# Patient Record
Sex: FEMALE | Race: WHITE | NOT HISPANIC OR LATINO | Employment: STUDENT | URBAN - METROPOLITAN AREA
[De-identification: names, ages, dates, MRNs, and addresses within clinical notes are randomized per-mention and may not be internally consistent; named-entity substitution may affect disease eponyms.]

---
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
## 2020-11-05 ENCOUNTER — VIRTUAL VISIT (OUTPATIENT)
Dept: FAMILY MEDICINE | Facility: OTHER | Age: 20
End: 2020-11-05
Payer: COMMERCIAL

## 2020-11-05 PROCEDURE — 99421 OL DIG E/M SVC 5-10 MIN: CPT | Performed by: PHYSICIAN ASSISTANT

## 2020-11-05 NOTE — PROGRESS NOTES
"Date: 2020 11:34:59  Clinician: Romeo Tsang  Clinician NPI: [id number]  Patient: Nedra Colón  Patient : 2000  Patient Address: [address] [place] [address]  Patient Phone: [phone]  Visit Protocol: URI  Patient Summary:  Nedra is a 20 year old ( : 2000 ) female who initiated a [facility] Visit for COVID-19 (Coronavirus) evaluation and screening. When asked the question \"Please sign me up to receive news, health information and promotions from [facility].\", Nedra responded \"No\".    Nedra states her symptoms started 1-2 days ago.   Her symptoms consist of myalgia, malaise, a sore throat, a headache, enlarged lymph nodes, a cough, and nausea.   Symptom details     Cough: Nedra coughs a few times an hour and her cough is not more bothersome at night. Phlegm comes into her throat when she coughs. She believes her cough is caused by post-nasal drip. The color of the phlegm is clear.     Sore throat: Nedra reports having mild throat pain (1-3 on a 10 point pain scale), does not have exudate on her tonsils, and can swallow liquids. The lymph nodes in her neck are enlarged. A rash has not appeared on the skin since the sore throat started.     Headache: She states the headache is mild (1-3 on a 10 point pain scale).      Nedra denies having vomiting, rhinitis, facial pain or pressure, chills, teeth pain, ageusia, diarrhea, ear pain, wheezing, fever, nasal congestion, and anosmia. She also denies taking antibiotic medication in the past month and having recent facial or sinus surgery in the past 60 days. She is not experiencing dyspnea.   Precipitating events  Within the past week, Nedra has not been exposed to someone with strep throat. She has not recently been exposed to someone with influenza. Nedra has not been in close contact with any high risk individuals.   Pertinent COVID-19 (Coronavirus) information  Nedra does not work or volunteer as healthcare worker or a first " responder. In the past 14 days, Nedra has not worked or volunteered at a healthcare facility or group living setting.   In the past 14 days, she also has not lived in a congregate living setting.   Nedra has not had a close contact with a laboratory-confirmed COVID-19 patient within 14 days of symptom onset.    Since December 2019, Nedra has not been tested for COVID-19 and has not had upper respiratory infection or influenza-like illness.   Pertinent medical history  Nedra does not get yeast infections when she takes antibiotics.   Nedra does not need a return to work/school note.   Weight: 110 lbs   Nedra does not smoke or use smokeless tobacco.   She denies pregnancy and denies breastfeeding. She is currently menstruating.   Weight: 110 lbs    MEDICATIONS: No current medications, ALLERGIES: NKDA  Clinician Response:  Dear Nedra,   Your symptoms show that you may have coronavirus (COVID-19). This illness can cause fever, cough and trouble breathing. Many people get a mild case and get better on their own. Some people can get very sick.  What should I do?  We would like to test you for this virus.   1. Please call [phone] to schedule your visit. Explain that you were referred by [facility] to have a COVID-19 test. Be ready to share your [facility] visit ID number.  * If you need to schedule in [facility] please call [phone] or for [place] employees please call [phone].  * If you need to schedule in the [place] area please call [phone]. [place] employees call [phone].  The following will serve as your written order for this COVID Test, ordered by me, for the indication of suspected COVID [Z20.828]: The test will be ordered in [employer], our electronic health record, after you are scheduled. It will show as ordered and authorized by Gelacio Zheng MD.  Order: COVID-19 (Coronavirus) PCR for SYMPTOMATIC testing from [facility].   2. When it's time for your COVID test:  Stay at least 6 feet away from  "others. (If someone will drive you to your test, stay in the backseat, as far away from the  as you can.)   Cover your mouth and nose with a mask, tissue or washcloth.  Go straight to the testing site. Don't make any stops on the way there or back.      3.Starting now: Stay home and away from others (self-isolate) until:   You've had no fever---and no medicine that reduces fever---for one full day (24 hours). And...   Your other symptoms have gotten better. For example, your cough or breathing has improved. And...   At least 10 days have passed since your symptoms started.       During this time, don't leave the house except for testing or medical care.   Stay in your own room, even for meals. Use your own bathroom if you can.   Stay away from others in your home. No hugging, kissing or shaking hands. No visitors.  Don't go to work, school or anywhere else.    Clean \"high touch\" surfaces often (doorknobs, counters, handles, etc.). Use a household cleaning spray or wipes. You'll find a full list of  on the EPA website: www.epa.gov/pesticide-registration/list-n-disinfectants-use-against-sars-cov-2.   Cover your mouth and nose with a mask, tissue or washcloth to avoid spreading germs.  Wash your hands and face often. Use soap and water.  Caregivers in these groups are at risk for severe illness due to COVID-19:  o People 65 years and older  o People who live in a nursing home or long-term care facility  o People with chronic disease (lung, heart, cancer, diabetes, kidney, liver, immunologic)  o People who have a weakened immune system, including those who:   Are in cancer treatment  Take medicine that weakens the immune system, such as corticosteroids  Had a bone marrow or organ transplant  Have an immune deficiency  Have poorly controlled HIV or AIDS  Are obese (body mass index of 40 or higher)  Smoke regularly   o Caregivers should wear gloves while washing dishes, handling laundry and cleaning bedrooms " and bathrooms.  o Use caution when washing and drying laundry: Don't shake dirty laundry, and use the warmest water setting that you can.  o For more tips, go to www.cdc.gov/coronavirus/2019-ncov/downloads/10Things.pdf.       How can I take care of myself?   Get lots of rest. Drink extra fluids (unless a doctor has told you not to).   Take Tylenol (acetaminophen) for fever or pain. If you have liver or kidney problems, ask your family doctor if it's okay to take Tylenol.   Adults can take either:    650 mg (two 325 mg pills) every 4 to 6 hours, or...   1,000 mg (two 500 mg pills) every 8 hours as needed.    Note: Don't take more than 3,000 mg in one day. Acetaminophen is found in many medicines (both prescribed and over-the-counter medicines). Read all labels to be sure you don't take too much.   For children, check the Tylenol bottle for the right dose. The dose is based on the child's age or weight.    If you have other health problems (like cancer, heart failure, an organ transplant or severe kidney disease): Call your specialty clinic if you don't feel better in the next 2 days.       Know when to call 911. Emergency warning signs include:    Trouble breathing or shortness of breath Pain or pressure in the chest that doesn't go away Feeling confused like you haven't felt before, or not being able to wake up Bluish-colored lips or face.  Where can I get more information?   [facility] -- About COVID-19: [email or web address]   CDC -- What to Do If You're Sick: www.cdc.gov/coronavirus/2019-ncov/about/steps-when-sick.html   CDC -- Ending Home Isolation: www.cdc.gov/coronavirus/2019-ncov/hcp/disposition-in-home-patients.html   CDC -- Caring for Someone: www.cdc.gov/coronavirus/2019-ncov/if-you-are-sick/care-for-someone.html   [facility] -- Interim Guidance for Hospital Discharge to Home: www.health.[facility].[place]./diseases/coronavirus/hcp/hospdischarge.pdf   [facility] clinical trials (COVID-19  research studies): clinicalaffairs.[facility]-clinical-trials    Below are the COVID-19 hotlines at the [place] Department of Health ([facility]). Interpreters are available.    For health questions: Call 756-617-0952 or 1-694.548.7501 (7 a.m. to 7 p.m.) For questions about schools and childcare: Call 888-428-1377 or 1-390.117.7961 (7 a.m. to 7 p.m.)    Diagnosis: Contact with and (suspected) exposure to other viral communicable diseases  Diagnosis ICD: Z20.828

## 2021-03-11 ENCOUNTER — TRANSFERRED RECORDS (OUTPATIENT)
Dept: HEALTH INFORMATION MANAGEMENT | Facility: CLINIC | Age: 21
End: 2021-03-11

## 2021-07-05 ENCOUNTER — PRE VISIT (OUTPATIENT)
Dept: OTOLARYNGOLOGY | Facility: CLINIC | Age: 21
End: 2021-07-05

## 2021-07-05 ENCOUNTER — VIRTUAL VISIT (OUTPATIENT)
Dept: OTOLARYNGOLOGY | Facility: CLINIC | Age: 21
End: 2021-07-05
Payer: COMMERCIAL

## 2021-07-05 DIAGNOSIS — J38.3 VOCAL CORD DYSFUNCTION: Primary | ICD-10-CM

## 2021-07-05 DIAGNOSIS — R06.09 DYSPNEA ON EXERTION: ICD-10-CM

## 2021-07-05 PROCEDURE — 92507 TX SP LANG VOICE COMM INDIV: CPT | Mod: GN | Performed by: SPEECH-LANGUAGE PATHOLOGIST

## 2021-07-05 PROCEDURE — 92524 BEHAVRAL QUALIT ANALYS VOICE: CPT | Mod: GN | Performed by: SPEECH-LANGUAGE PATHOLOGIST

## 2021-07-05 PROCEDURE — 99207 PR NO CHARGE LOS: CPT | Performed by: SPEECH-LANGUAGE PATHOLOGIST

## 2021-07-05 NOTE — LETTER
7/5/2021       RE: Nedra Colón  [address] [facility] [address]     Dear Colleague,    Thank you for referring your patient, Nedra Colón, to the [facility] [place] at [facility]. Please see a copy of my visit note below.    Nedra Colón is a 20 year old female who is being evaluated via a billable video visit.      Nedra has been notified and verbally consented to the following:     This video visit will be conducted between you and your provider.    Patient has opted to conduct today's video visit vs an in-person appointment.     Video visits are billed at different rates depending on your insurance coverage. Please reach out to your insurance provider with any questions.     If during the course of the call the provider feels the appointment is not appropriate, you will not be charged for this service.  Provider has received verbal consent for billable virtual visit from the patient? Yes  Will anyone else be joining your video visit? No    Call initiated at: 1300   Type of Visit Platform Used: [employer]  Location of provider: [facility]  Location of patient: [facility]  Song Marroquin Jr., M.D., F.A.C.S.  Michelle Posey M.D., M.P.H.  Shira Thakkar M.D.  Pallavi Nielsen, Ph.D., CCC-SLP  Melvin An, Ph.D., CCC-SLP  Daxa Fitzpatrick M.M. (voice), M.A., CCC-SLP  Coleman Rinaldi M.M. (voice), M.A., CCC-SLP  ANA MARÍA Henderson (voice), M.S., CCC-SLP    [facility]  BREATHING DISORDER EVALUATION AND TREATMENT REPORT    Patient: Nedra Colón  Date of Service: 7/5/2021    HISTORY OF PRESENT ILLNESS  Nedra Colón was seen for evaluation and treatment for Vocal Cord Dysfunction (VCD), also known as Paradoxical Vocal Fold Motion (PVFM), today.  She was referred for this visit by Dr. Nye.  Please refer to Dr. Nye's dictation elsewhere in this chart for a more complete history of her  "disorder.     Nedra is a 20 year old who reports she did synchronized swimming in high school and initially went in to be seen for difficulty breathing her [employer] year of high school. She was initially told it was likely mild exercise-induced asthma. Recently, she had to be seen for clearance to take a scuba-diving class, and her pulmonologist suspected that her EIA is more likely VCD. She still has difficulty with inhalation whenever she does exercise or her allergies are triggered around pet dander, or in cold air with activity like shoveling snow. 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     The following is a brief synopsis of her reported symptom history:    Initial onset: 6 years ago (in mario alberto year of high school)     Description: \"After a routine or during Mello or Just Dance, I have coughing fits when I try to breathe. \"    difficulty with inhalation    central chest tightness/pressure    inspiratory stridor    expiratory wheeze    increased coughing or throat clearing    increased phlegm    Episodes occur dependent on strenuous exercise- typically 2-3x/week    Symptoms are Stable since onset    Onset is typically within 3-4 minutes of triggers.    Episodes resolve within 2-3 minutes.    Strategies that reduce symptoms: placing arms over her head to open chest, slow, deep breaths, stopping and resting.     Patient reports inhalers were slightly helpful.     Activities/triggers include:  o physical activity (running, stairs, swimming, dancing and hiking)  o voice use (laughing)  o temperatures (cold air)  o post-nasal drainage    Additional symptoms include:  o frequent sighing or yawning    Ongoing impact on ADLs includes not being able to keep up with friends during activities.     Reflux: Yes, she wakes up during the night and feels reflux in her throat after eating particular foods.    Mucus/PND: Yes, more than the average person. " "    Pertinent Medical Hx:     Unremarkable, healthy    Treatment hx of symptoms has included:   o PCP workup included diagnosis of exercise-induced asthma  o Pulmonary workup is negative for asthma  - Diagnostic exams have included PFT, spirometry  - Treatment attempts have included inhalers, with limited results.    Current medications which can affect laryngeal system: none    PATIENT REPORTED MEASURES  Dyspnea Index Questionnaire:  Dyspnea Index 7/5/2021   1. I have trouble getting air in. 2   2. I feel tightness in my throat when I am having arturo breathing problem. 2   3. It takes more effort to breathe than it used to. 2   4. Change in weather affect my breathing problem. 0   5. My breathing gets worse with stress. 0   6. I make sound/noise breathing in 1   7. I have to strain to breathe. 1   8. My shortness of breath gets worse with exercise or physical activity 4   9. My breathing problem makes me feel stressed. 0   10. My breathing problem casuses me to restrict my personal and social life. 3   Dyspnea Index Total Score 15       Self-Ratings as discussed with patient:  Dysponia SLP Goals 7/5/2021   How severe is your cough /throat clearing, if 0 is no cough at all and 10 is the worst cough? 6   How would you rate your breathing, if 0 is the worst and 10 is the best? 5   How much does your breathing problem bother you?         Somewhat       PERCEPTUAL BREATHING EVALUATION (CPT 84365)  At rest: appears within normal limits; quiet, no apparent distress or effort  When asked to take a volitional \"deep\" breath: excessive thoracic muscle use pattern, clavicular elevation during inspiration and increased upper thoracic muscle recruitment  When asked to pant: excessive thoracic muscle use pattern, clavicular elevation during inspiration and shoulder and neck involvement  During exertion doing jumping jackRadha graysa demonstrated:    elevated and tense shoulders    clavicular elevation for inspiration    within 3 " "minutes    THERAPEUTIC ACTIVITIES  During this process, Nedra learned:    Techniques for oral configurations to use during inspiration, to provide better abduction and arrest inhalatory stridor; these included: inhaling through rounded lips or through a straw; inhaling through the nose with closed lips; inhaling with tongue tip lightly touching the roof of her mouth, and inhaling with her tongue slightly curled down the center to create a pathway for airflow    Techniques for oral configurations to use during exhalation, to provide increased intra-oral and supraglottic air pressure to decrease vocal fold adduction; these included: a pulsed and prolonged \"Shhhh\" on exhalation, and a sustained pursed lip exhalation with slight ballooning of cheeks    To use abdominal relaxation and contraction of the external intercostals during inhalation, to allow for maximum diaphragmatic descent; I instructed her to place her hands on her lower ribcage to provide manual feedback for correct inhalation technique; she found this to be very helpful    During these probes, Nedra reported:     She found the tongue tuck and taco tongue with pulsed \"shh\" exhalation to be most facilitative to improve air flow    She felt her neck was less tight than it is otherwise and her recovery from exercise was faster than normal    Education and Counseling:    I provided education regarding laryngeal anatomy and physiology, including explanation of irritable larynx syndrome and the self-perpetuating cycle of laryngeal irritation and resulting symptoms.     I provided an explanation of the therapy plan, as it specifically related to her individual symptoms, and therapeutic rationale and instruction of a practice regimen.     She found this information helpful and states she is eager to attend therapy and apply techniques.     IMPRESSIONS AND PLAN  Based on today's evaluation and treatment, it would seem likely that Evelyns dyspnea on exertion has " been due to both poor laryngeal mechanics (Vocal Cord Dysfunction, J38.3) and non-optimal respiratory mechanics (Dyspnea on Exertion, R06.02).  With training of techniques for laryngeal and respiratory mechanics, she was able to exert herself with greatly reduced symptoms.  She will continue practicing these techniques at home, with reinforcement and instruction of additional strategies to be provided at future sessions. Nedra is in agreement with this plan.     FREQUENCY/DURATION: Two monthly one-hour return video visit sessions    Goals:  Patient goal:    To understand the problem and fix it as much as possible     Short-term goal(s): Within the first 4 sessions, Nedra will:  -- utilize vocal hygiene strategies in order to minimize laryngeal irritation and facilitate improved therapeutic outcomes with 90% accy.  -- demonstrate silent inhalation and abdominal breathing pattern in order to optimize breathing mechanics with 90% accy and min cues.  -- demonstrate relaxed throat breathing and laryngeal release in order to reduce upper airway constriction with 90% accy and min cues.    Long-term goal(s): In 3 months, Nedra will:  -- report a week of typical activities, in which shortness of breath does not exceed a level  of 2 out of 10, 90% of the time.    This treatment plan was developed with the patient who agreed with the recommendations.    PRIMARY ICD-10 code:  Vocal Cord Dysfunction (VCD)/Paradoxical Vocal Fold Motion (PVFM) (J38.3)  SECONDARY ICD-10 code: Dyspnea On Exertion (R06.09)    TOTAL SERVICE TIME: 65 minutes  EVALUATION OF VOICE AND RESONANCE (41579)  TREATMENT (52756)  NO CHARGE FACILITY FEE (84232)    Susie Henderson (voice) MPeñaS., CCC-SLP  Speech-Language Pathologist  [facility]  [phone]  [email or web address]  Pronouns: she/her/hers      *this report was created in part through the use of computerized dictation software, and though reviewed following completion, some  typographic errors may persist.  If there is confusion regarding any of this notes contents, please contact me for clarification        Again, thank you for allowing me to participate in the care of your patient.      Sincerely,    Lorenza Finley SLP

## 2021-07-05 NOTE — PATIENT INSTRUCTIONS
Hello!    It was a pleasure to see you today. Please complete the exercises below and remember, a few minutes of practice many times throughout the day is more important than one large practice session. If you have any questions about your strategies, feel free to contact me at [email or web address] or via [employer]. Please call [phone] for scheduling alterations.     Please know that this email will be automatically deleted from the  after 30 days, so please COPY AND SAVE your exercises and handouts to your own computer.     Home Exercise Program:  BELLY BREATHING (3 breaths per hour, until habit)  If you need to initially, stretch your arms up and lean toward each side, feeling your ribcage lift and expand. Then lean forward and allow your back and neck to stretch and relax for 30 seconds.   1. Sit hinged forward with your elbows on your knees. OR Sit normally and place your hands on either side of your belly or low ribs  2. Slowly breathe in and feel your belly and back expand, like filling a balloon, pushing out against your waistband  3. Slowly breathe out and feel your belly and back crunch inward, like zipping tight pants  4. Repeat slowly and take breaks if you get dizzy  HELPFUL HINTS:  -- Tie a string or piece of elastic around your low ribs when you get dressed. You'll be able to feel yourself expand against this as you breathe all day.   -- Some people find it easiest to practice while laying on their back or in a recliner, hands on their belly.    RESCUE BREATHS (4-5 breaths, 2-3 sets/day and IF SHORT OF BREATH)  -- Use a mirror if it helps to see your mouth and lips  -- Remember to focus on SILENT inhalation every time  -- Practice these daily when you're relaxed and focus on correct technique. The more you practice, the easier these become when you are not relaxed.    TONGUE TUCK: Place the tip of your tongue against the roof of your mouth, creating a column in the center. Breathe in  "and feel cool & silent air sweep in along the sides of your tongue and down the back of your throat. Now keep your tongue tip in place and simply exhale.     This strategy will make your mouth dry, so stop and move some saliva around with your tongue whenever needed, then return to your strategy.     The tongue tuck is easiest to learn and start applying to movement, but doesn't open the vocal folds as forcefully as some other strategies.     STRAW LIPS & Shhh: Round your lips like a milkshake straw and inhale, feeling cool & silent air sweep through the center of your mouth. Exhale on a \"shhhh\" or \"sh sh sh sh sh\", whichever feels better.     SNIFF & Shhh: Decide whether you prefer one long, gentle sniff in through your nose or 2-3 short, quick sniffs. Next, gently blow out through tight whistle lips with your cheeks ballooned. Blow more gently and slower if you feel dizzy. Exhale on a \"shhhh\" or \"sh sh sh sh sh\", whichever feels better.     PUPPY YAWN & BLOW: Let your tongue relax flat against your bottom lip like a puppy, keeping your lips and jaw open. Silently inhale with a cool, yawny sensation. Next, gently blow out through tight whistle lip with your cheeks ballooned. Blow more gently and slower if you feel dizzy.    TACO TONGUE & BLOW: If you can, curl your tongue slightly like a hard taco shell, keeping your lips and jaw open. Silently inhale with a cool, yawny sensation. Next, gently blow out through tight whistle lip with your cheeks ballooned. Blow more gently and slower if you feel dizzy.    Thank you and have a great day!  Lorenza"

## 2021-07-05 NOTE — PROGRESS NOTES
Nedra Colón is a 20 year old female who is being evaluated via a billable video visit.      Nedra has been notified and verbally consented to the following:     This video visit will be conducted between you and your provider.    Patient has opted to conduct today's video visit vs an in-person appointment.     Video visits are billed at different rates depending on your insurance coverage. Please reach out to your insurance provider with any questions.     If during the course of the call the provider feels the appointment is not appropriate, you will not be charged for this service.  Provider has received verbal consent for billable virtual visit from the patient? Yes  Will anyone else be joining your video visit? No    Call initiated at: 1300   Type of Visit Platform Used: [employer]  Location of provider: [facility]  Location of patient: [facility]  Song Marroquin Jr., M.D., F.A.C.S.  Michelle Posey M.D., M.P.H.  Shira Thakkar M.D.  Pallavi Nielsen, Ph.D., CCC-SLP  Melvin An, Ph.D., [facility]-SLP  Daxa Fitzpatrick M.M. (voice), M.A., CCC-SLP  Coleman Rinaldi M.M. (voice), M.A., CCC-SLP  ANA MARÍA Henderson (voice), M.S., [facility]  BREATHING DISORDER EVALUATION AND TREATMENT REPORT    Patient: Nedra Coóln  Date of Service: 7/5/2021    HISTORY OF PRESENT ILLNESS  Nedra oClón was seen for evaluation and treatment for Vocal Cord Dysfunction (VCD), also known as Paradoxical Vocal Fold Motion (PVFM), today.  She was referred for this visit by Dr. Nye.  Please refer to Dr. Nye's dictation elsewhere in this chart for a more complete history of her disorder.     Nedra is a 20 year old who reports she did synchronized swimming in high school and initially went in to be seen for difficulty breathing her mario alberto year of high school. She was initially told it was likely mild exercise-induced asthma. Recently, she had to be seen for clearance to take a  "scuba-diving class, and her pulmonologist suspected that her EIA is more likely VCD. She still has difficulty with inhalation whenever she does exercise or her allergies are triggered around pet dander, or in cold air with activity like shoveling snow. 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     The following is a brief synopsis of her reported symptom history:    Initial onset: 6 years ago (in mar[employer] alberto year of high school)     Description: \"After a routine or during Mello or Just Dance, I have coughing fits when I try to breathe. \"    difficulty with inhalation    central chest tightness/pressure    inspiratory stridor    expiratory wheeze    increased coughing or throat clearing    increased phlegm    Episodes occur dependent on strenuous exercise- typically 2-3x/week    Symptoms are Stable since onset    Onset is typically within 3-4 minutes of triggers.    Episodes resolve within 2-3 minutes.    Strategies that reduce symptoms: placing arms over her head to open chest, slow, deep breaths, stopping and resting.     Patient reports inhalers were slightly helpful.     Activities/triggers include:  o physical activity (running, stairs, swimming, dancing and hiking)  o voice use (laughing)  o temperatures (cold air)  o post-nasal drainage    Additional symptoms include:  o frequent sighing or yawning    Ongoing impact on ADLs includes not being able to keep up with friends during activities.     Reflux: Yes, she wakes up during the night and feels reflux in her throat after eating particular foods.    Mucus/PND: Yes, more than the average person.     Pertinent Medical Hx:     Unremarkable, healthy    Treatment hx of symptoms has included:   o PCP workup included diagnosis of exercise-induced asthma  o Pulmonary workup is negative for asthma  - Diagnostic exams have included PFT, spirometry  - Treatment attempts have included inhalers, with limited " "results.    Current medications which can affect laryngeal system: none    PATIENT REPORTED MEASURES  Dyspnea Index Questionnaire:  Dyspnea Index 7/5/2021   1. I have trouble getting air in. 2   2. I feel tightness in my throat when I am having arturo breathing problem. 2   3. It takes more effort to breathe than it used to. 2   4. Change in weather affect my breathing problem. 0   5. My breathing gets worse with stress. 0   6. I make sound/noise breathing in 1   7. I have to strain to breathe. 1   8. My shortness of breath gets worse with exercise or physical activity 4   9. My breathing problem makes me feel stressed. 0   10. My breathing problem casuses me to restrict my personal and social life. 3   Dyspnea Index Total Score 15       Self-Ratings as discussed with patient:  Dysponia SLP Goals 7/5/2021   How severe is your cough /throat clearing, if 0 is no cough at all and 10 is the worst cough? 6   How would you rate your breathing, if 0 is the worst and 10 is the best? 5   How much does your breathing problem bother you?         Somewhat       PERCEPTUAL BREATHING EVALUATION (CPT 98162)  At rest: appears within normal limits; quiet, no apparent distress or effort  When asked to take a volitional \"deep\" breath: excessive thoracic muscle use pattern, clavicular elevation during inspiration and increased upper thoracic muscle recruitment  When asked to pant: excessive thoracic muscle use pattern, clavicular elevation during inspiration and shoulder and neck involvement  During exertion doing jumping jacks, Nedra demonstrated:    elevated and tense shoulders    clavicular elevation for inspiration    within 3 minutes    THERAPEUTIC ACTIVITIES  During this process, Nedra learned:    Techniques for oral configurations to use during inspiration, to provide better abduction and arrest inhalatory stridor; these included: inhaling through rounded lips or through a straw; inhaling through the nose with closed lips; " "inhaling with tongue tip lightly touching the roof of her mouth, and inhaling with her tongue slightly curled down the center to create a pathway for airflow    Techniques for oral configurations to use during exhalation, to provide increased intra-oral and supraglottic air pressure to decrease vocal fold adduction; these included: a pulsed and prolonged \"Shhhh\" on exhalation, and a sustained pursed lip exhalation with slight ballooning of cheeks    To use abdominal relaxation and contraction of the external intercostals during inhalation, to allow for maximum diaphragmatic descent; I instructed her to place her hands on her lower ribcage to provide manual feedback for correct inhalation technique; she found this to be very helpful    During these probes, Nedra reported:     She found the tongue tuck and taco tongue with pulsed \"shh\" exhalation to be most facilitative to improve air flow    She felt her neck was less tight than it is otherwise and her recovery from exercise was faster than normal    Education and Counseling:    I provided education regarding laryngeal anatomy and physiology, including explanation of irritable larynx syndrome and the self-perpetuating cycle of laryngeal irritation and resulting symptoms.     I provided an explanation of the therapy plan, as it specifically related to her individual symptoms, and therapeutic rationale and instruction of a practice regimen.     She found this information helpful and states she is eager to attend therapy and apply techniques.     IMPRESSIONS AND PLAN  Based on today's evaluation and treatment, it would seem likely that Evelyns dyspnea on exertion has been due to both poor laryngeal mechanics (Vocal Cord Dysfunction, J38.3) and non-optimal respiratory mechanics (Dyspnea on Exertion, R06.02).  With training of techniques for laryngeal and respiratory mechanics, she was able to exert herself with greatly reduced symptoms.  She will continue practicing " these techniques at home, with reinforcement and instruction of additional strategies to be provided at future sessions. Nedra is in agreement with this plan.     FREQUENCY/DURATION: Two monthly one-hour return video visit sessions    Goals:  Patient goal:    To understand the problem and fix it as much as possible     Short-term goal(s): Within the first 4 sessions, Nedra will:  -- utilize vocal hygiene strategies in order to minimize laryngeal irritation and facilitate improved therapeutic outcomes with 90% accy.  -- demonstrate silent inhalation and abdominal breathing pattern in order to optimize breathing mechanics with 90% accy and min cues.  -- demonstrate relaxed throat breathing and laryngeal release in order to reduce upper airway constriction with 90% accy and min cues.    Long-term goal(s): In 3 months, Nedra will:  -- report a week of typical activities, in which shortness of breath does not exceed a level  of 2 out of 10, 90% of the time.    This treatment plan was developed with the patient who agreed with the recommendations.    PRIMARY ICD-10 code:  Vocal Cord Dysfunction (VCD)/Paradoxical Vocal Fold Motion (PVFM) (J38.3)  SECONDARY ICD-10 code: Dyspnea On Exertion (R06.09)    TOTAL SERVICE TIME: 65 minutes  EVALUATION OF VOICE AND RESONANCE (35393)  TREATMENT (25587)  NO CHARGE FACILITY FEE (32940)    Susie Henderson (voice), M.S., CCC-SLP  Speech-Language Pathologist  [facility]  [phone]  [email or web address]  Pronouns: she/her/hers      *this report was created in part through the use of computerized dictation software, and though reviewed following completion, some typographic errors may persist.  If there is confusion regarding any of this notes contents, please contact me for clarification

## 2021-07-29 ENCOUNTER — VIRTUAL VISIT (OUTPATIENT)
Dept: OTOLARYNGOLOGY | Facility: CLINIC | Age: 21
End: 2021-07-29
Payer: COMMERCIAL

## 2021-07-29 DIAGNOSIS — R06.09 DYSPNEA ON EXERTION: ICD-10-CM

## 2021-07-29 DIAGNOSIS — J38.3 VOCAL CORD DYSFUNCTION: Primary | ICD-10-CM

## 2021-07-29 PROCEDURE — 92507 TX SP LANG VOICE COMM INDIV: CPT | Mod: GN | Performed by: SPEECH-LANGUAGE PATHOLOGIST

## 2021-07-29 NOTE — LETTER
7/29/2021       RE: Nedra Colón  [address] [facility] [place] [address]     Dear Colleague,    Thank you for referring your patient, Nedra Colón, to the [facility]. Please see a copy of my visit note below.    Nedra Colón is a 21 year old female who is being evaluated via a billable video visit.      Nedra has been notified and verbally consented to the following:     This video visit will be conducted between you and your provider.    Patient has opted to conduct today's video visit vs an in-person appointment.     Video visits are billed at different rates depending on your insurance coverage. Please reach out to your insurance provider with any questions.     If during the course of the call the provider feels the appointment is not appropriate, you will not be charged for this service.  Provider has received verbal consent for billable virtual visit from the patient? Yes  Will anyone else be joining your video visit? No    Call initiated at: 0810   Type of Visit Platform Used: [employer]  Location of provider: Home  Location of patient: [facility] VOICE CLINIC  PROGRESS REPORT (CPT 25375)    Patient: Nedra Colón  Date of Service: 7/29/2021  Date of Last Service: 7/5/2021  Referring Physician: Dr. Nye  Impressions from evaluation on 7/5/2021 by Susie Henderson (voice) M.SPeña, CCC-SLP:  Based on today's evaluation and treatment, it would seem likely that Evelyns dyspnea on exertion has been due to both poor laryngeal mechanics (Vocal Cord Dysfunction, J38.3) and non-optimal respiratory mechanics (Dyspnea on Exertion, R06.02).  With training of techniques for laryngeal and respiratory mechanics, she was able to exert herself with greatly reduced symptoms.  She will continue practicing these techniques at home, with reinforcement and instruction of additional strategies to be provided at future  "sessions. Nedra is in agreement with this plan.    SUBJECTIVE:  Since Nedra's last session, she reports the following:   o Overall she has not had any change in her symptoms, which is expected, as she has only participated in an evaluation thus far and no therapeutic suggestions were made at the time.  o She feels like she still has difficulty doing the abdominal breathing pattern and notices her shoulders going up and down.   o She's been using the tongue tuck while climbing a big hill at camp and thinks it's a little helpful.     OBJECTIVE:  Patient Specific Goal Metrics:  Dysponia SLP Goals 7/5/2021   How severe is your cough /throat clearing, if 0 is no cough at all and 10 is the worst cough? 6   How would you rate your breathing, if 0 is the worst and 10 is the best? 5   How much does your breathing problem bother you?         Somewhat       THERAPEUTIC ACTIVITIES  Today Nedra participated in the following therapeutic activities:  Counseling and Education:    Asked questions about the nature of her symptoms, and I answered all of these thoroughly.    Concepts and techniques for using saline and plain-water gargling, nasal irrigation, gel saline spray, humidification, increased liquid intake, and steam to increase systemic and typical hydration and reduce post-nasal drainage and laryngeal irritation.    I provided Nedra with explanation and skilled instruction of:  Laryngeal release technique targeting reduced laryngeal elevation and constriction and improved vocal fold aBduction was instructed    Patient was instructed through negative practice of laryngeal elevation with high-frequency, closed vowel productions and low-frequency, open vowel productions in an effort to develop somato-sensory awareness of the laryngeal musculature    This was combined with aBductory breathing maneuvers    Patient was instructed to utilize a silent, yawny, \"uh\"-shaped inhalation with application to all other therapy " "tasks    She demonstrated excellent accuracy following moderate clinician support    Rescue breathing strategies using oral configurations to promote improved vocal fold abduction were instructed    Patient reported the tongue tuck and the \"taco tongue\" was most beneficial    Patient was able to demonstrate use of these techniques in combination with low respiratory engagement with good accuracy and moderate clinician support    A plan for when and how to implement these strategies was developed, and the patient was encouraged to practice the techniques independent of distress at least twice daily to habituate their use.   Review of abdominal breathing pattern with additional strategies was also provided, with improved accy using positional modifications and tactile feedback.     Instruction of Home Practice:    I instructed Nedra in the concepts of an optimal practice regimen, including use of an interval schedule with brief periods of practice frequently throughout each day, and concepts of volitional practice to facilitate motor learning.    I emphasized the therapeutic rational and provided an emailed After Visit Summary to reinforce today's therapeutic activities and facilitate home practice.    ASSESSMENT/PLAN  Progress toward long-term goals:  Minimal at this point, as this is first session, but good learning today    Impressions:  IMPRESSIONS: poor laryngeal mechanics (Vocal Cord Dysfunction, J38.3) and non-optimal respiratory mechanics (Dyspnea on Exertion, R06.02)    Nedra had a productive session of therapy today, working on laryngeal release, additional rescue breathing strategies, and clarification of abdominal breathing, plus PND management. She will continue to work on her exercises on a daily basis, and work on incorporating the techniques into her daily activities. Speech therapy continues to be medically necessary for Nedra to participate fully and engage in activities of daily living.     Plan: "   I will see Nedra in 6 weeks and will plan to add LPR precautions and combine breathing strategies with activity. Add session if cough and throat clearing is ongoing.   For practice goals, see AVS.     TOTAL SERVICE TIME: 60 minutes  TREATMENT (54791)  NO CHARGE FACILITY FEE    Susie Henderson (voice), M.S., CCC-SLP  Speech-Language Pathologist  [facility]  [phone]  [email or web address]  Pronouns: she/her/hers      *this report was created in part through the use of computerized dictation software, and though reviewed following completion, some typographic errors may persist.  If there is confusion regarding any of this notes contents, please contact me for clarification      Again, thank you for allowing me to participate in the care of your patient.      Sincerely,    Lorenza Finley, SLP

## 2021-07-29 NOTE — PROGRESS NOTES
Nedra Colón is a 21 year old female who is being evaluated via a billable video visit.      Nedra has been notified and verbally consented to the following:     This video visit will be conducted between you and your provider.    Patient has opted to conduct today's video visit vs an in-person appointment.     Video visits are billed at different rates depending on your insurance coverage. Please reach out to your insurance provider with any questions.     If during the course of the call the provider feels the appointment is not appropriate, you will not be charged for this service.  Provider has received verbal consent for billable virtual visit from the patient? Yes  Will anyone else be joining your video visit? No    Call initiated at: 0810   Type of Visit Platform Used: [employer] Video  Location of provider: Home  Location of patient: [facility] VOICE CLINIC  PROGRESS REPORT (CPT 01170)    Patient: Nedra Colón  Date of Service: 7/29/2021  Date of Last Service: 7/5/2021  Referring Physician: Dr. Nye  Impressions from evaluation on 7/5/2021 by Susie Henderson (voice), M.S., CCC-SLP:  Based on today's evaluation and treatment, it would seem likely that Evelyns dyspnea on exertion has been due to both poor laryngeal mechanics (Vocal Cord Dysfunction, J38.3) and non-optimal respiratory mechanics (Dyspnea on Exertion, R06.02).  With training of techniques for laryngeal and respiratory mechanics, she was able to exert herself with greatly reduced symptoms.  She will continue practicing these techniques at home, with reinforcement and instruction of additional strategies to be provided at future sessions. Nedra is in agreement with this plan.    SUBJECTIVE:  Since Nedra's last session, she reports the following:   o Overall she has not had any change in her symptoms, which is expected, as she has only participated in an evaluation thus far and no therapeutic suggestions were made at the time.  o She feels  "like she still has difficulty doing the abdominal breathing pattern and notices her shoulders going up and down.   o She's been using the tongue tuck while climbing a big hill at camp and thinks it's a little helpful.     OBJECTIVE:  Patient Specific Goal Metrics:  Dysponia SLP Goals 7/5/2021   How severe is your cough /throat clearing, if 0 is no cough at all and 10 is the worst cough? 6   How would you rate your breathing, if 0 is the worst and 10 is the best? 5   How much does your breathing problem bother you?         Somewhat       THERAPEUTIC ACTIVITIES  Today Nedra participated in the following therapeutic activities:  Counseling and Education:    Asked questions about the nature of her symptoms, and I answered all of these thoroughly.    Concepts and techniques for using saline and plain-water gargling, nasal irrigation, gel saline spray, humidification, increased liquid intake, and steam to increase systemic and typical hydration and reduce post-nasal drainage and laryngeal irritation.    I provided Nedra with explanation and skilled instruction of:  Laryngeal release technique targeting reduced laryngeal elevation and constriction and improved vocal fold aBduction was instructed    Patient was instructed through negative practice of laryngeal elevation with high-frequency, closed vowel productions and low-frequency, open vowel productions in an effort to develop somato-sensory awareness of the laryngeal musculature    This was combined with aBductory breathing maneuvers    Patient was instructed to utilize a silent, yawny, \"uh\"-shaped inhalation with application to all other therapy tasks    She demonstrated excellent accuracy following moderate clinician support    Rescue breathing strategies using oral configurations to promote improved vocal fold abduction were instructed    Patient reported the tongue tuck and the \"taco tongue\" was most beneficial    Patient was able to demonstrate use of these " techniques in combination with low respiratory engagement with good accuracy and moderate clinician support    A plan for when and how to implement these strategies was developed, and the patient was encouraged to practice the techniques independent of distress at least twice daily to habituate their use.   Review of abdominal breathing pattern with additional strategies was also provided, with improved accy using positional modifications and tactile feedback.     Instruction of Home Practice:    I instructed Nedra in the concepts of an optimal practice regimen, including use of an interval schedule with brief periods of practice frequently throughout each day, and concepts of volitional practice to facilitate motor learning.    I emphasized the therapeutic rational and provided an emailed After Visit Summary to reinforce today's therapeutic activities and facilitate home practice.    ASSESSMENT/PLAN  Progress toward long-term goals:  Minimal at this point, as this is first session, but good learning today    Impressions:  IMPRESSIONS: poor laryngeal mechanics (Vocal Cord Dysfunction, J38.3) and non-optimal respiratory mechanics (Dyspnea on Exertion, R06.02)    Nedra had a productive session of therapy today, working on laryngeal release, additional rescue breathing strategies, and clarification of abdominal breathing, plus PND management. She will continue to work on her exercises on a daily basis, and work on incorporating the techniques into her daily activities. Speech therapy continues to be medically necessary for Nedra to participate fully and engage in activities of daily living.     Plan:   I will see Nedra in 6 weeks and will plan to add LPR precautions and combine breathing strategies with activity. Add session if cough and throat clearing is ongoing.   For practice goals, see AVS.     TOTAL SERVICE TIME: 60 minutes  TREATMENT (67899)  NO CHARGE FACILITY FEE    Barbara Henderson. (voice), M.S.,  CCC-SLP  Speech-Language Pathologist  [facility]  [phone]  [email or web address]  Pronouns: she/her/hers      *this report was created in part through the use of computerized dictation software, and though reviewed following completion, some typographic errors may persist.  If there is confusion regarding any of this notes contents, please contact me for clarification

## 2021-07-29 NOTE — PATIENT INSTRUCTIONS
Hello!     It was a pleasure to see you today. Please complete the exercises below and remember, a few minutes of practice many times throughout the day is more important than one large practice session. If you have any questions about your strategies, feel free to contact me at [email or web address] or via [employer]. Please call [phone] for scheduling alterations.      Please know that this email will be automatically deleted from the  after 30 days, so please COPY AND SAVE your exercises and handouts to your own computer.      Home Exercise Program:  MUCUS MANAGEMENT (ongoing as needed)  -- Mucus is your body's way of cleaning out particles of dust, dirt, and allergens that you've breathed in. These particles get stuck in the mucus and it slowly drips out of your sinuses and down the back of your throat, where it is typically swallowed and removed from your body. Mucus is also produced to help protect your sinuses and nasal passages from excessive dryness in the winter, preventing those tissues from drying and cracking (nosebleeds). If your body is dehydrated, even normal mucus amounts can thicken and get stickier, causing more coughing and throat clearing.  1. Sinus rinse 2-3x/week (NeilMed Sinus Rinse bottle with warmed distilled water and saline mix; breathe out through your mouth on a [facility] as you squeeze, blow your nose after, blow your nose 20-30 minutes later to get the last bit of water out.)  2. Gargle ~2 oz warm water with 1/4 tsp mixture, each AM/PM (Mix a container of equal parts salt/baking soda and keep next to your toothbrush)  3. Drink about 60 ounces of water each day  4. Gel saline spray to be used AM and/or PM ([place], Neilmed)  5. Humidifier or bowl of warm water near your bed or in your office in the winter. Aim for about 45-55% humidity to prevent your mucosal tissues from over-drying during the winter. If you aren't sure what the humidity level is, you can purchase a hygrometer  "for about $10.  6. Steaming: hot showers, boil a pot of water and hold a towel over your head to make a steam tent, facial steamers, wring out a hot, damp washcloth and then hold it over your nose and mouth while breathing.     BELLY BREATHING (3 breaths per hour, until habit)  If you need to initially, stretch your arms up and lean toward each side, feeling your ribcage lift and expand. Then lean forward and allow your back and neck to stretch and relax for 30 seconds.   1. Sit hinged forward with your elbows on your knees. OR Sit normally and place your hands on either side of your belly or low ribs  2. Slowly breathe in and feel your belly and back expand, like filling a balloon, pushing out against your waistband  3. Slowly breathe out and feel your belly and back crunch inward, like zipping tight pants  4. Repeat slowly and take breaks if you get dizzy  HELPFUL HINTS:  -- Tie a string or piece of elastic around your low ribs when you get dressed. You'll be able to feel yourself expand against this as you breathe all day.   -- Practice wall sitting and glue your shoulders to the wall. Focus on feeling your belly expand and contract. Practice laying on your back and feeling your belly then chest expand and then belly then chest deflate.     LARYNGEAL RELEASE  Learning Tasks:  1. Place your finger tips along the center of your throat/Santosh's apple  2. Swallow and notice your voice box move up and back down  3. Compare a high-pitch EE (voice box goes up slightly) with a silent, yawny sigh on \"Huh\" like the word \"hug\" (voice box goes down slightly)  Homework:  1. Attempt to inhale SILENTLY with a deep, yawny \"uh\" shape in your throat and see if your voice box releases slightly lower. LOW, YAWNY \"UH\"  2. Repeat 3 breaths each hour until this release becomes easy and automatic.     RESCUE BREATHS (4-5 breaths, 2-3 sets/day and IF SHORT OF BREATH)  -- Use a mirror if it helps to see your mouth and lips  -- Remember to " "focus on SILENT inhalation every time  -- Practice these daily when you're relaxed and focus on correct technique. The more you practice, the easier these become when you are not relaxed.    TONGUE TUCK: Place the tip of your tongue against the roof of your mouth, creating a column in the center. Breathe in and feel cool & silent air sweep in along the sides of your tongue and down the back of your throat. Now keep your tongue tip in place and simply exhale.     This strategy will make your mouth dry, so stop and move some saliva around with your tongue whenever needed, then return to your strategy.     The tongue tuck is easiest to learn and start applying to movement, but doesn't open the vocal folds as forcefully as some other strategies.     STRAW LIPS & Shhh: Round your lips like a milkshake straw and inhale, feeling cool & silent air sweep through the center of your mouth. Exhale on a \"shhhh\" or \"sh sh sh sh sh\", whichever feels better.     SNIFF & Shhh: Decide whether you prefer one long, gentle sniff in through your nose or 2-3 short, quick sniffs. Next, gently blow out through tight whistle lips with your cheeks ballooned. Blow more gently and slower if you feel dizzy. Exhale on a \"shhhh\" or \"sh sh sh sh sh\", whichever feels better.     PUPPY YAWN & BLOW: Let your tongue relax flat against your bottom lip like a puppy, keeping your lips and jaw open. Silently inhale with a cool, yawny sensation. Next, gently blow out through tight whistle lip with your cheeks ballooned. Blow more gently and slower if you feel dizzy.    TACO TONGUE & BLOW: If you can, curl your tongue slightly like a hard taco shell, keeping your lips and mouth open. Silently inhale with a cool, yawny sensation. Next, gently blow out through tight whistle lip with your cheeks ballooned. Blow more gently and slower if you feel dizzy.    JUDI or \"Hand Breath\": Place an ice-cream cone shaped hand against open, rounded lips with your tongue " flat in your mouth. Slowly inhale and feel a silent, low breath sweep in and down your throat. Now tighten your hand to a fist and gently blow out, feeling a ballooning stretch through your cheeks and throat. Avoid blowing so hard that you feel strained. This is the failsafe or recovery breath.     Thank you and have a great day!  Lorenza

## 2021-08-14 ENCOUNTER — HEALTH MAINTENANCE LETTER (OUTPATIENT)
Age: 21
End: 2021-08-14

## 2021-08-31 ENCOUNTER — VIRTUAL VISIT (OUTPATIENT)
Dept: OTOLARYNGOLOGY | Facility: CLINIC | Age: 21
End: 2021-08-31
Payer: COMMERCIAL

## 2021-08-31 DIAGNOSIS — R06.09 DYSPNEA ON EXERTION: ICD-10-CM

## 2021-08-31 DIAGNOSIS — J38.3 VOCAL CORD DYSFUNCTION: Primary | ICD-10-CM

## 2021-08-31 PROCEDURE — 92507 TX SP LANG VOICE COMM INDIV: CPT | Mod: GN | Performed by: SPEECH-LANGUAGE PATHOLOGIST

## 2021-08-31 NOTE — LETTER
8/31/2021       RE: Nedra Colón  [address] [facility] [place] [address]     Dear Colleague,    Thank you for referring your patient, Nedra Colón, to the [facility] [place] at [facility]. Please see a copy of my visit note below.    Nedra Colón is a 21 year old female who is being evaluated via a billable video visit.      Nedra has been notified and verbally consented to the following:     This video visit will be conducted between you and your provider.    Patient has opted to conduct today's video visit vs an in-person appointment.     Video visits are billed at different rates depending on your insurance coverage. Please reach out to your insurance provider with any questions.     If during the course of the call the provider feels the appointment is not appropriate, you will not be charged for this service.  Provider has received verbal consent for billable virtual visit from the patient? Yes  Will anyone else be joining your video visit? No    Call initiated at: 0800   Type of Visit Platform Used: [employer]  Location of provider: Home  Location of patient: [facility]  PROGRESS REPORT (CPT [id number])    Patient: Nedra Colón  Date of Service: 8/31/2021  Date of Last Service: 7/29/2021  Referring Physician: Dr. Nye  Impressions from evaluation on 7/5/2021 by Susie Henderson (voice) M.SPeña, CCC-SLP:  Based on today's evaluation and treatment, it would seem likely that Evelyns dyspnea on exertion has been due to both poor laryngeal mechanics (Vocal Cord Dysfunction, J38.3) and non-optimal respiratory mechanics (Dyspnea on Exertion, R06.02).  With training of techniques for laryngeal and respiratory mechanics, she was able to exert herself with greatly reduced symptoms.  She will continue practicing these techniques at home, with reinforcement and instruction of additional strategies to be provided at future  sessions. Nedra is in agreement with this plan.     SUBJECTIVE:  Since Nedra's last session, she reports the following:   o Overall symptoms are improving. She hasn't been doing the sinus rinse as frequently as she would like to due to time constraints.   o Her exercises have going well and she was climbing a big hill at camp several times a day, with the exercises helping. She likes the straw sip & bullfrog best.   o She feels like her coughing and throat clearing has been better, but throat clearing is throughout the day.     OBJECTIVE:  Patient Specific Goal Metrics:  Dysponia SLP Goals 7/5/2021 7/29/2021 8/31/2021   How severe is your cough /throat clearing, if 0 is no cough at all and 10 is the worst cough? 6 5 4   How would you rate your breathing, if 0 is the worst and 10 is the best? 5 5 7   How much does your breathing problem bother you?         Somewhat Somewhat A little bit       THERAPEUTIC ACTIVITIES  Today Nedra participated in the following therapeutic activities:  I provided Nedra with explanation and skilled instruction of:  Brief review of rescue breaths revealed excellent accy and carryover.     Instruction for adding activity to breathing strategies was provided, with mod-max fading to min verbal and visual cues, and good accy demonstrated        Instruction of Home Practice:    A revised regimen for home practice was instructed.    I provided an AVS of important notes of today's therapeutic activities to facilitate practice.    ASSESSMENT/PLAN  Progress toward long-term goals:  Adequate but incomplete progress; please see above    Impressions:  IMPRESSIONS: poor laryngeal mechanics (Vocal Cord Dysfunction, J38.3) and non-optimal respiratory mechanics (Dyspnea on Exertion, R06.02)     Nedra had a productive session of therapy today, working on added activity to rescue breathing and cough/throat clear avoidance.  She will continue to work on her exercises on a daily basis, and work on  incorporating the techniques into her daily activities. Speech therapy continues to be medically necessary for Nedra to participate fully and engage in activities of daily living.     Plan:   I will see Nedra in 4 weeks and will plan to check on rescue breathing with activity, continue reinforcing cough/throat clear avoidance, instruct LPR & SOVT/Flow. Likely discharge.    For practice goals, see AVS.     TOTAL SERVICE TIME: 30 minutes  TREATMENT (37226)  NO CHARGE FACILITY FEE    Susie Henderson (voice), M.S., CCC-SLP  Speech-Language Pathologist  [facility] Voice [facility]  [phone]  [email or web address]  Pronouns: she/her/hers      *this report was created in part through the use of computerized dictation software, and though reviewed following completion, some typographic errors may persist.  If there is confusion regarding any of this notes contents, please contact me for clarification      Again, thank you for allowing me to participate in the care of your patient.      Sincerely,    Lorenza Finley, SLP

## 2021-08-31 NOTE — PROGRESS NOTES
Nedra Colón is a 21 year old female who is being evaluated via a billable video visit.      Nedra has been notified and verbally consented to the following:     This video visit will be conducted between you and your provider.    Patient has opted to conduct today's video visit vs an in-person appointment.     Video visits are billed at different rates depending on your insurance coverage. Please reach out to your insurance provider with any questions.     If during the course of the call the provider feels the appointment is not appropriate, you will not be charged for this service.  Provider has received verbal consent for billable virtual visit from the patient? Yes  Will anyone else be joining your video visit? No    Call initiated at: 0800   Type of Visit Platform Used: [employer]  Location of provider: Home  Location of patient: [facility]ICE CLINIC  PROGRESS REPORT (CPT [id number])    Patient: Nedra Colón  Date of Service: 8/31/2021  Date of Last Service: 7/29/2021  Referring Physician: Dr. Nye  Impressions from evaluation on 7/5/2021 by Susie Henderson (voice), M.S., CCC-SLP:  Based on today's evaluation and treatment, it would seem likely that Evelyns dyspnea on exertion has been due to both poor laryngeal mechanics (Vocal Cord Dysfunction, J38.3) and non-optimal respiratory mechanics (Dyspnea on Exertion, R06.02).  With training of techniques for laryngeal and respiratory mechanics, she was able to exert herself with greatly reduced symptoms.  She will continue practicing these techniques at home, with reinforcement and instruction of additional strategies to be provided at future sessions. Nedra is in agreement with this plan.     SUBJECTIVE:  Since Nedra's last session, she reports the following:   o Overall symptoms are improving. She hasn't been doing the sinus rinse as frequently as she would like to due to time constraints.   o Her exercises have going well and she was climbing a big  hill at camp several times a day, with the exercises helping. She likes the straw sip & bullfrog best.   o She feels like her coughing and throat clearing has been better, but throat clearing is throughout the day.     OBJECTIVE:  Patient Specific Goal Metrics:  Dysponia SLP Goals 7/5/2021 7/29/2021 8/31/2021   How severe is your cough /throat clearing, if 0 is no cough at all and 10 is the worst cough? 6 5 4   How would you rate your breathing, if 0 is the worst and 10 is the best? 5 5 7   How much does your breathing problem bother you?         Somewhat Somewhat A little bit       THERAPEUTIC ACTIVITIES  Today Nedra participated in the following therapeutic activities:  I provided Nedra with explanation and skilled instruction of:  Brief review of rescue breaths revealed excellent accy and carryover.     Instruction for adding activity to breathing strategies was provided, with mod-max fading to min verbal and visual cues, and good accy demonstrated        Instruction of Home Practice:    A revised regimen for home practice was instructed.    I provided an AVS of important notes of today's therapeutic activities to facilitate practice.    ASSESSMENT/PLAN  Progress toward long-term goals:  Adequate but incomplete progress; please see above    Impressions:  IMPRESSIONS: poor laryngeal mechanics (Vocal Cord Dysfunction, J38.3) and non-optimal respiratory mechanics (Dyspnea on Exertion, R06.02)     Nedra had a productive session of therapy today, working on added activity to rescue breathing and cough/throat clear avoidance.  She will continue to work on her exercises on a daily basis, and work on incorporating the techniques into her daily activities. Speech therapy continues to be medically necessary for Nedra to participate fully and engage in activities of daily living.     Plan:   I will see Nedra in 4 weeks and will plan to check on rescue breathing with activity, continue reinforcing cough/throat clear  avoidance, instruct LPR & SOVT/Flow. Likely discharge.    For practice goals, see AVS.     TOTAL SERVICE TIME: 30 minutes  TREATMENT (73262)  NO CHARGE FACILITY FEE    Susie Henderson (voice), M.S., CCC-SLP  Speech-Language Pathologist  [facility] Voice [facility]  [phone]  [email or web address]  Pronouns: she/her/hers      *this report was created in part through the use of computerized dictation software, and though reviewed following completion, some typographic errors may persist.  If there is confusion regarding any of this notes contents, please contact me for clarification

## 2021-08-31 NOTE — PATIENT INSTRUCTIONS
Hello!     It was a pleasure to see you today. Please complete the exercises below and remember, a few minutes of practice many times throughout the day is more important than one large practice session. If you have any questions about your strategies, feel free to contact me at [email or web address] or via [employer]. Please call [phone] for scheduling alterations.      Please know that this email will be automatically deleted from the  after 30 days, so please COPY AND SAVE your exercises and handouts to your own computer.      Home Exercise Program:  MUCUS MANAGEMENT (ongoing as needed)  -- Mucus is your body's way of cleaning out particles of dust, dirt, and allergens that you've breathed in. These particles get stuck in the mucus and it slowly drips out of your sinuses and down the back of your throat, where it is typically swallowed and removed from your body. Mucus is also produced to help protect your sinuses and nasal passages from excessive dryness in the winter, preventing those tissues from drying and cracking (nosebleeds). If your body is dehydrated, even normal mucus amounts can thicken and get stickier, causing more coughing and throat clearing.  1. Sinus rinse 2-3x/week (NeilMed Sinus Rinse bottle with warmed distilled water and saline mix; breathe out through your mouth on a [facility] as you squeeze, blow your nose after, blow your nose 20-30 minutes later to get the last bit of water out.)  2. Gargle ~2 oz warm water with 1/4 tsp mixture, each AM/PM (Mix a container of equal parts salt/baking soda and keep next to your toothbrush)  3. Drink about 60 ounces of water each day  4. Gel saline spray to be used AM and/or PM ([place], Neilmed)  5. Humidifier or bowl of warm water near your bed or in your office in the winter. Aim for about 45-55% humidity to prevent your mucosal tissues from over-drying during the winter. If you aren't sure what the humidity level is, you can purchase a hygrometer  "for about $10.  6. Steaming: hot showers, boil a pot of water and hold a towel over your head to make a steam tent, facial steamers, wring out a hot, damp washcloth and then hold it over your nose and mouth while breathing.      BELLY BREATHING (3 breaths per hour, until habit)  If you need to initially, stretch your arms up and lean toward each side, feeling your ribcage lift and expand. Then lean forward and allow your back and neck to stretch and relax for 30 seconds.   1. Sit hinged forward with your elbows on your knees. OR Sit normally and place your hands on either side of your belly or low ribs  2. Slowly breathe in and feel your belly and back expand, like filling a balloon, pushing out against your waistband  3. Slowly breathe out and feel your belly and back crunch inward, like zipping tight pants  4. Repeat slowly and take breaks if you get dizzy  HELPFUL HINTS:  -- Tie a string or piece of elastic around your low ribs when you get dressed. You'll be able to feel yourself expand against this as you breathe all day.   -- Practice wall sitting and glue your shoulders to the wall. Focus on feeling your belly expand and contract. Practice laying on your back and feeling your belly then chest expand and then belly then chest deflate.      LARYNGEAL RELEASE  Learning Tasks:  1. Place your finger tips along the center of your throat/Santosh's apple  2. Swallow and notice your voice box move up and back down  3. Compare a high-pitch EE (voice box goes up slightly) with a silent, yawny sigh on \"Huh\" like the word \"hug\" (voice box goes down slightly)  Homework:  1. Attempt to inhale SILENTLY with a deep, yawny \"uh\" shape in your throat and see if your voice box releases slightly lower. LOW, YAWNY \"UH\"  2. Repeat 3 breaths each hour until this release becomes easy and automatic.     RESCUE BREATHS (4-5 breaths, 2-3 sets/day and IF SHORT OF BREATH)  -- Use a mirror if it helps to see your mouth and lips  -- Remember to " "focus on SILENT inhalation every time  -- Practice these daily when you're relaxed and focus on correct technique. The more you practice, the easier these become when you are not relaxed.    TONGUE TUCK: Place the tip of your tongue against the roof of your mouth, creating a column in the center. Breathe in and feel cool & silent air sweep in along the sides of your tongue and down the back of your throat. Now keep your tongue tip in place and simply exhale.     This strategy will make your mouth dry, so stop and move some saliva around with your tongue whenever needed, then return to your strategy.     The tongue tuck is easiest to learn and start applying to movement, but doesn't open the vocal folds as forcefully as some other strategies.     STRAW LIPS & Shhh: Round your lips like a milkshake straw and inhale, feeling cool & silent air sweep through the center of your mouth. Exhale on a \"shhhh\" or \"sh sh sh sh sh\", whichever feels better.     SNIFF & Shhh: Decide whether you prefer one long, gentle sniff in through your nose or 2-3 short, quick sniffs. Next, gently blow out through tight whistle lips with your cheeks ballooned. Blow more gently and slower if you feel dizzy. Exhale on a \"shhhh\" or \"sh sh sh sh sh\", whichever feels better.     PUPPY YAWN & BLOW: Let your tongue relax flat against your bottom lip like a puppy, keeping your lips and jaw open. Silently inhale with a cool, yawny sensation. Next, gently blow out through tight whistle lip with your cheeks ballooned. Blow more gently and slower if you feel dizzy.    TACO TONGUE & BLOW: If you can, curl your tongue slightly like a hard taco shell, keeping your lips and mouth open. Silently inhale with a cool, yawny sensation. Next, gently blow out through tight whistle lip with your cheeks ballooned. Blow more gently and slower if you feel dizzy.    JUDI or \"Hand Breath\": Place an ice-cream cone shaped hand against open, rounded lips with your tongue " "flat in your mouth. Slowly inhale and feel a silent, low breath sweep in and down your throat. Now tighten your hand to a fist and gently blow out, feeling a ballooning stretch through your cheeks and throat. Avoid blowing so hard that you feel strained. This is the failsafe or recovery breath.   Once you feel comfortable practicing your rescue breathing when relaxed, slowly start adding movement.   1. Start by just moving your feet.   2. Once your feet are on auto-, count out loud only on your RIGHT foot (\"In, Out, 2, 3, 4, 5, In, Out, 2, 3, 4, 5\"). Running: In, Out, 2, 3.    3. Gradually add the actual breathing with whichever strategy you prefer. If you get off with the breaths, keep walking and give yourself a few steps to come back in with the breathing.   4. Start very slowly and gradually increase your speed. If you feel like the strategy isn't working, double-check that your technique is correct. Small details like a silent inhalation can make all the difference.(1. SILENT inhale, 2. Low larynx UH, 3. Belly & Ribs)     COUGH & THROAT CLEAR AVOIDANCE (ongoing and as needed)  -- Use these strategies whenever you feel a tickle or itch like you're about to cough or clear your throat. Most people pick 2-3 favorites.    1. Swallow really hard, like getting a golf ball down. Feel your throat squeeze really tight.   2. Keep water with you to sip.  3. See if icy water or ice chips soothe your throat, or if warm/hot beverages soothe your throat. If so, keep some handy in an insulated cup.   4. Hum or sigh on a \"Hmmm\" and feel a gentle vibration in your throat. Get creative at work or in conversations: \"hmm...\" \"mmhmm...\" \"ummm\"  5. Imagine you're blowing up a balloon, but keep your lips closed so your throat and cheeks balloon instead. Hold your breath and count to 5.   6. Silently yawn, letting your tongue relax flat like a puppy dog.   7. Gargle a small sip of water.   8. Rescue Breaths   9. Keep using your " "strategies until at least 40 seconds have gone by.  10. Gently clear your throat with a throat \"achhh\" or a silent \"ah ah ah\" like the Count from [employer]  11. TO STOP A COUGHING FIT: Seal a tight fist around your mouth and block the air from getting out, making your throat/cheeks balloon. Some air will still sneak through and you don't want to block the air completely, but the goal is to create a ballooning back pressure that helps open and calm the vocal folds. Sniff in through your nose to refill, and repeat until the cough stops.  HELPFUL HINTS:  -- Place reminder notes with 2-3 strategies each around your home & work place  -- Recruit friends and family to help remind you if they hear you cough/throat clear     Thank you and have a great day!  Lorenza  "

## 2021-10-08 ENCOUNTER — VIRTUAL VISIT (OUTPATIENT)
Dept: OTOLARYNGOLOGY | Facility: CLINIC | Age: 21
End: 2021-10-08
Payer: COMMERCIAL

## 2021-10-08 DIAGNOSIS — J38.3 VOCAL CORD DYSFUNCTION: Primary | ICD-10-CM

## 2021-10-08 DIAGNOSIS — R49.0 DYSPHONIA: ICD-10-CM

## 2021-10-08 DIAGNOSIS — R06.09 DYSPNEA ON EXERTION: ICD-10-CM

## 2021-10-08 PROCEDURE — 92507 TX SP LANG VOICE COMM INDIV: CPT | Mod: GN | Performed by: SPEECH-LANGUAGE PATHOLOGIST

## 2021-10-08 NOTE — PATIENT INSTRUCTIONS
"Hello!     It was a pleasure to see you today. Please complete the exercises below and remember, a few minutes of practice many times throughout the day is more important than one large practice session. If you have any questions about your strategies, feel free to contact me at [email or web address] or via [employer]. Please call [phone] for scheduling alterations.      Please know that this email will be automatically deleted from the  after 30 days, so please COPY AND SAVE your exercises and handouts to your own computer.      Home Exercise Program:  - Look into a humidifier for your room, particularly now that the temperatures outside are dropping. If nothing else, put a bowl of hot water on your nightstand or desk on a towel.     VOICE EXERCISES (AM & PM, more if needed)  -- Inhale SILENTLY and feel your belly fill with air  -- Slowly exhale as your belly contracts, ROUND \"OO\" LIPS  -- Straw and 1 inch of water in a cup  -- \"Whoooooo\" (smooth bubbles like a motor boat or a simmering pot of water)    3x silently with just air for 5-7 seconds    3x short and easy sighs     3x foghorn on single pitch for 5-7 seconds    3x sliding lower for 5-7 seconds (\"ding dong\")    3x sliding higher for 5-7 seconds (\"here comes the bride\")    3x sliding up and down like sirens    Bonus: Easy songs/melodies with slurred phrases and no stopping bubbles  -- Double-check that your LIPS are ROUNDED  -- Gravel: Think more breathy and faster blowing.     REFLUX MANAGEMENT (ongoing as needed)  -- When reflux comes up the wrong way from your stomach and spills into your throat, you may not feel it come up if the acidity levels are low. However, liquid spilling onto the vocal cords can cause lots of throat irritation, cough and throat clearing, hoarseness, and even difficulty breathing.   1. Elevate the head of your bed about 4 inches, trying to keep the mattress straight to avoid any back or neck aches. Try layering " "blankets and quilts back and forth to form a smooth wedge- about 6\" at the top OR check out the LUCID mattress wedge elevator on [employer].   2. Avoid eating/drinking close to when you lay down. The less there is in your stomach, the less can spill back up during the night.   3. Try to eat foods with a high PH level (less acidic). Possible reflux triggers include spicy foods, acidic foods, and foods that may relax the esophageal sphincters and allow back-splash, such as raw onions, garlic, or peppers, store-bought tomato sauces/salsas, pizza, greasy foods, orange or lemon juice, chocolate, caffeine, carbonation, alcohol, mints and menthol. Try to avoid these foods for about a month so your throat has time to heal and calm down, then reintroduce 1 food every 3-4 days, using a notebook to track any increase in symptoms to figure out which foods are your trigger foods.   4. Avoid putting pressure on your stomach by slouching, wearing tight clothes.   5. Actively reduce stress. Stress can cause a fight or flight response in your body, even if you feel like you're able to handle stress. Try deep breathing, yoga, or mindfulness meditation to help calm your nervous system (Calm, Headspace, Yoga with Halina on youtube, etc). Square Breathing is an easy to use stress management tool: Inhale for 4 seconds, hold your breath for 4 seconds, exhale for 4 seconds, and hold your breath for 4 seconds. Repeat at least 4 times.   6. You could consider sipping, gargling, and swallowing about 6 oz of alkaline water in the evening, which helps neutralize any acidity coming up to your throat. Keep in mind that alkaline water looses it's alkaline properties the longer it has been bottled, so check the bottling dates or find a store like [employer] where you can fill your own container of freshly alkalized water.     MUCUS MANAGEMENT (ongoing as needed)  -- Mucus is your body's way of cleaning out particles of dust, dirt, and allergens that " you've breathed in. These particles get stuck in the mucus and it slowly drips out of your sinuses and down the back of your throat, where it is typically swallowed and removed from your body. Mucus is also produced to help protect your sinuses and nasal passages from excessive dryness in the winter, preventing those tissues from drying and cracking (nosebleeds). If your body is dehydrated, even normal mucus amounts can thicken and get stickier, causing more coughing and throat clearing.  1. Sinus rinse 2-3x/week (NeilMed Sinus Rinse bottle with warmed distilled water and saline mix; breathe out through your mouth on a [facility] as you squeeze, blow your nose after, blow your nose 20-30 minutes later to get the last bit of water out.)  2. Gargle ~2 oz warm water with 1/4 tsp mixture, each AM/PM (Mix a container of equal parts salt/baking soda and keep next to your toothbrush)  3. Drink about 60 ounces of water each day  4. Gel saline spray to be used AM and/or PM ([place], Neilmed)  5. Humidifier or bowl of warm water near your bed or in your office in the winter. Aim for about 45-55% humidity to prevent your mucosal tissues from over-drying during the winter. If you aren't sure what the humidity level is, you can purchase a hygrometer for about $10.  6. Steaming: hot showers, boil a pot of water and hold a towel over your head to make a steam tent, facial steamers, wring out a hot, damp washcloth and then hold it over your nose and mouth while breathing.      BELLY BREATHING (3 breaths per hour, until habit)  If you need to initially, stretch your arms up and lean toward each side, feeling your ribcage lift and expand. Then lean forward and allow your back and neck to stretch and relax for 30 seconds.   1. Sit hinged forward with your elbows on your knees. OR Sit normally and place your hands on either side of your belly or low ribs  2. Slowly breathe in and feel your belly and back expand, like filling a balloon, pushing  "out against your waistband  3. Slowly breathe out and feel your belly and back crunch inward, like zipping tight pants  4. Repeat slowly and take breaks if you get dizzy  HELPFUL HINTS:  -- Tie a string or piece of elastic around your low ribs when you get dressed. You'll be able to feel yourself expand against this as you breathe all day.   -- Practice wall sitting and glue your shoulders to the wall. Focus on feeling your belly expand and contract. Practice laying on your back and feeling your belly then chest expand and then belly then chest deflate.      LARYNGEAL RELEASE  Learning Tasks:  1. Place your finger tips along the center of your throat/Santosh's apple  2. Swallow and notice your voice box move up and back down  3. Compare a high-pitch EE (voice box goes up slightly) with a silent, yawny sigh on \"Huh\" like the word \"hug\" (voice box goes down slightly)  Homework:  1. Attempt to inhale SILENTLY with a deep, yawny \"uh\" shape in your throat and see if your voice box releases slightly lower. LOW, YAWNY \"UH\"  2. Repeat 3 breaths each hour until this release becomes easy and automatic.     RESCUE BREATHS (4-5 breaths, 2-3 sets/day and IF SHORT OF BREATH)  -- Use a mirror if it helps to see your mouth and lips  -- Remember to focus on SILENT inhalation every time  -- Practice these daily when you're relaxed and focus on correct technique. The more you practice, the easier these become when you are not relaxed.    TONGUE TUCK: Place the tip of your tongue against the roof of your mouth, creating a column in the center. Breathe in and feel cool & silent air sweep in along the sides of your tongue and down the back of your throat. Now keep your tongue tip in place and simply exhale.     This strategy will make your mouth dry, so stop and move some saliva around with your tongue whenever needed, then return to your strategy.     The tongue tuck is easiest to learn and start applying to movement, but doesn't open the " "vocal folds as forcefully as some other strategies.     STRAW LIPS & Shhh: Round your lips like a milkshake straw and inhale, feeling cool & silent air sweep through the center of your mouth. Exhale on a \"shhhh\" or \"sh sh sh sh sh\", whichever feels better.     SNIFF & Shhh: Decide whether you prefer one long, gentle sniff in through your nose or 2-3 short, quick sniffs. Next, gently blow out through tight whistle lips with your cheeks ballooned. Blow more gently and slower if you feel dizzy. Exhale on a \"shhhh\" or \"sh sh sh sh sh\", whichever feels better.     PUPPY YAWN & BLOW: Let your tongue relax flat against your bottom lip like a puppy, keeping your lips and jaw open. Silently inhale with a cool, yawny sensation. Next, gently blow out through tight whistle lip with your cheeks ballooned. Blow more gently and slower if you feel dizzy.    TACO TONGUE & BLOW: If you can, curl your tongue slightly like a hard taco shell, keeping your lips and mouth open. Silently inhale with a cool, yawny sensation. Next, gently blow out through tight whistle lip with your cheeks ballooned. Blow more gently and slower if you feel dizzy.    JUDI or \"Hand Breath\": Place an ice-cream cone shaped hand against open, rounded lips with your tongue flat in your mouth. Slowly inhale and feel a silent, low breath sweep in and down your throat. Now tighten your hand to a fist and gently blow out, feeling a ballooning stretch through your cheeks and throat. Avoid blowing so hard that you feel strained. This is the failsafe or recovery breath.   Once you feel comfortable practicing your rescue breathing when relaxed, slowly start adding movement.   1. Start by just moving your feet.   2. Once your feet are on auto-, count out loud only on your RIGHT foot (\"In, Out, 2, 3, 4, 5, In, Out, 2, 3, 4, 5\"). Running: In, Out, 2, 3.    3. Gradually add the actual breathing with whichever strategy you prefer. If you get off with the breaths, keep " "walking and give yourself a few steps to come back in with the breathing.   4. Start very slowly and gradually increase your speed. If you feel like the strategy isn't working, double-check that your technique is correct. Small details like a silent inhalation can make all the difference.(1. SILENT inhale, 2. Low larynx UH, 3. Belly & Ribs)      COUGH & THROAT CLEAR AVOIDANCE (ongoing and as needed)  -- Use these strategies whenever you feel a tickle or itch like you're about to cough or clear your throat. Most people pick 2-3 favorites.    1. Swallow really hard, like getting a golf ball down. Feel your throat squeeze really tight.   2. Keep water with you to sip.  3. See if icy water or ice chips soothe your throat, or if warm/hot beverages soothe your throat. If so, keep some handy in an insulated cup.   4. Hum or sigh on a \"Hmmm\" and feel a gentle vibration in your throat. Get creative at work or in conversations: \"hmm...\" \"mmhmm...\" \"ummm\"  5. Imagine you're blowing up a balloon, but keep your lips closed so your throat and cheeks balloon instead. Hold your breath and count to 5.   6. Silently yawn, letting your tongue relax flat like a puppy dog.   7. Gargle a small sip of water.   8. Rescue Breaths   9. Keep using your strategies until at least 40 seconds have gone by.  10. Gently clear your throat with a throat \"achhh\" or a silent \"ah ah ah\" like the Count from [employer]  11. TO STOP A COUGHING FIT: Seal a tight fist around your mouth and block the air from getting out, making your throat/cheeks balloon. Some air will still sneak through and you don't want to block the air completely, but the goal is to create a ballooning back pressure that helps open and calm the vocal folds. Sniff in through your nose to refill, and repeat until the cough stops.  HELPFUL HINTS:  -- Place reminder notes with 2-3 strategies each around your home & work place  -- Recruit friends and family to help remind you if they hear " you cough/throat clear      Thank you and have a great day!  Lorenza

## 2021-10-08 NOTE — LETTER
10/8/2021       RE: Nedra Colón  [address] [facility] [place] [address]     Dear Colleague,    Thank you for referring your patient, Nedra Colón, to the [facility] VOICE [facility]IN[facility] [place] [facility]. Please see a copy of my visit note below.    Nedra Colón is a 21 year old female who is being evaluated via a billable video visit.      Nedra has been notified and verbally consented to the following:     This video visit will be conducted between you and your provider.    Patient has opted to conduct today's video visit vs an in-person appointment.     Video visits are billed at different rates depending on your insurance coverage. Please reach out to your insurance provider with any questions.     If during the course of the call the provider feels the appointment is not appropriate, you will not be charged for this service.  Provider has received verbal consent for billable virtual visit from the patient? Yes  Will anyone else be joining your video visit? No    Call initiated at: 0900   Type of Visit Platform Used: Metranome  Location of provider: Home  Location of patient: [facility] VOICE CLINIC  PROGRESS REPORT (CPT [id number])    Patient: Nedra Colón  Date of Service: 10/8/2021  Date of Last Service: 8/31/2021  Referring Physician: Dr. Nye  Impressions from evaluation on 7/5/2021 by Susie Henderson (voice) M.SPeña, CCC-SLP:  Based on today's evaluation and treatment, it would seem likely that Evelyns dyspnea on exertion has been due to both poor laryngeal mechanics (Vocal Cord Dysfunction, J38.3) and non-optimal respiratory mechanics (Dyspnea on Exertion, R06.02).  With training of techniques for laryngeal and respiratory mechanics, she was able to exert herself with greatly reduced symptoms.  She will continue practicing these techniques at home, with reinforcement and instruction of additional strategies to be provided at future  "sessions. Nedra is in agreement with this plan.    SUBJECTIVE:  Since Nedra's last session, she reports the following:   o Overall symptoms are \"pretty good.\" She went for a swim this week and only needed her rescue breathing a little, but was able to successfully manage her symptoms.   o Her coughing and throat clearing has been a little bit better. She tries to use her strategies, but sometimes forgets to do so before she coughs or clears her throat.   o Her voice is unchanged and is particularly raspy during the morning, improving throughout the day.     OBJECTIVE:  Patient Specific Goal Metrics:  Dysponia SLP Goals 7/29/2021 8/31/2021 10/8/2021   How severe is your cough /throat clearing, if 0 is no cough at all and 10 is the worst cough? 5 4 3   How would you rate your breathing, if 0 is the worst and 10 is the best? 5 7 7   How much does your breathing problem bother you?         Somewhat A little bit A little bit       THERAPEUTIC ACTIVITIES  Today Nedra participated in the following therapeutic activities:  Counseling and Education:    Asked questions about the nature of her symptoms, and I answered all of these thoroughly.    Concepts and strategies for managing laryngopharyngeal reflux disorder, to reduce laryngeal inflammation. This included education regarding the impact of reflux on the laryngeal system and management tasks including diet modification, head of bed elevation, avoidance of oral intake prior to laying down, and stress management.    I provided Nedra with explanation and skilled instruction of:  Exercises to coordinate phonation with optimal flowing airstream and reduce phonatory impact.     Semi-occluded vocal tract exercises with a straw and cup of 1-1.5\" water (\"straw and bubbles\") were most facilitating    She progressed through the warm-up level of phonatory complexity    Instructed to use as a voice warm up, cool down, coordination of breath flow with phonation, and for tissue " mobilization.    Specific modifications instructed included increased labial rounding and increased airflow    Patient demonstrated good learning, but will need practice and additional reinforcement    Instruction of Home Practice:    A revised regimen for home practice was instructed.    I provided an AVS of important notes of today's therapeutic activities to facilitate practice.    ASSESSMENT/PLAN  Progress toward long-term goals:  Adequate but incomplete progress; please see above    Impressions:  IMPRESSIONS: poor laryngeal mechanics (Vocal Cord Dysfunction, J38.3) and non-optimal respiratory mechanics (Dyspnea on Exertion, R06.02)     Nedra had a productive session of therapy today, working on reflux precautions and introductory voice tasks.  She will continue to work on her exercises on a daily basis, and work on incorporating the techniques into her daily activities. Speech therapy continues to be medically necessary for Nedra to participate fully and engage in activities of daily living.     Plan:   I will see Nedra in 2 weeks and will plan to instruct respiratory mechanics briefly then progress cup bubbles to hums and speech tasks.   For practice goals, see AVS.     TOTAL SERVICE TIME: 50 minutes  TREATMENT (26231)  NO CHARGE FACILITY FEE    Susie Henderson (voice), M.S., CCC-SLP  Speech-Language Pathologist  [facility]  [phone]  [email or web address]  Pronouns: she/her/hers      *this report was created in part through the use of computerized dictation software, and though reviewed following completion, some typographic errors may persist.  If there is confusion regarding any of this notes contents, please contact me for clarification      Again, thank you for allowing me to participate in the care of your patient.      Sincerely,    Lorenza Finley, SLP

## 2021-10-08 NOTE — PROGRESS NOTES
"Nedra Colón is a 21 year old female who is being evaluated via a billable video visit.      Nedra has been notified and verbally consented to the following:     This video visit will be conducted between you and your provider.    Patient has opted to conduct today's video visit vs an in-person appointment.     Video visits are billed at different rates depending on your insurance coverage. Please reach out to your insurance provider with any questions.     If during the course of the call the provider feels the appointment is not appropriate, you will not be charged for this service.  Provider has received verbal consent for billable virtual visit from the patient? Yes  Will anyone else be joining your video visit? No    Call initiated at: 0900   Type of Visit Platform Used: [employer] Video  Location of provider: Home  Location of patient: [facility] VOICE CLINIC  PROGRESS REPORT (CPT [id number])    Patient: Nedra Colón  Date of Service: 10/8/2021  Date of Last Service: 8/31/2021  Referring Physician: Dr. Nye  Impressions from evaluation on 7/5/2021 by Susie Henderson (voice), M.S., CCC-SLP:  Based on today's evaluation and treatment, it would seem likely that Evelyns dyspnea on exertion has been due to both poor laryngeal mechanics (Vocal Cord Dysfunction, J38.3) and non-optimal respiratory mechanics (Dyspnea on Exertion, R06.02).  With training of techniques for laryngeal and respiratory mechanics, she was able to exert herself with greatly reduced symptoms.  She will continue practicing these techniques at home, with reinforcement and instruction of additional strategies to be provided at future sessions. Nedra is in agreement with this plan.    SUBJECTIVE:  Since Nedra's last session, she reports the following:   o Overall symptoms are \"pretty good.\" She went for a swim this week and only needed her rescue breathing a little, but was able to successfully manage her symptoms.   o Her coughing and throat " "clearing has been a little bit better. She tries to use her strategies, but sometimes forgets to do so before she coughs or clears her throat.   o Her voice is unchanged and is particularly raspy during the morning, improving throughout the day.     OBJECTIVE:  Patient Specific Goal Metrics:  Dysponia SLP Goals 7/29/2021 8/31/2021 10/8/2021   How severe is your cough /throat clearing, if 0 is no cough at all and 10 is the worst cough? 5 4 3   How would you rate your breathing, if 0 is the worst and 10 is the best? 5 7 7   How much does your breathing problem bother you?         Somewhat A little bit A little bit       THERAPEUTIC ACTIVITIES  Today Nedra participated in the following therapeutic activities:  Counseling and Education:    Asked questions about the nature of her symptoms, and I answered all of these thoroughly.    Concepts and strategies for managing laryngopharyngeal reflux disorder, to reduce laryngeal inflammation. This included education regarding the impact of reflux on the laryngeal system and management tasks including diet modification, head of bed elevation, avoidance of oral intake prior to laying down, and stress management.    I provided Nedra with explanation and skilled instruction of:  Exercises to coordinate phonation with optimal flowing airstream and reduce phonatory impact.     Semi-occluded vocal tract exercises with a straw and cup of 1-1.5\" water (\"straw and bubbles\") were most facilitating    She progressed through the warm-up level of phonatory complexity    Instructed to use as a voice warm up, cool down, coordination of breath flow with phonation, and for tissue mobilization.    Specific modifications instructed included increased labial rounding and increased airflow    Patient demonstrated good learning, but will need practice and additional reinforcement    Instruction of Home Practice:    A revised regimen for home practice was instructed.    I provided an AVS of " important notes of today's therapeutic activities to facilitate practice.    ASSESSMENT/PLAN  Progress toward long-term goals:  Adequate but incomplete progress; please see above    Impressions:  IMPRESSIONS: poor laryngeal mechanics (Vocal Cord Dysfunction, J38.3) and non-optimal respiratory mechanics (Dyspnea on Exertion, R06.02)     Nedra had a productive session of therapy today, working on reflux precautions and introductory voice tasks.  She will continue to work on her exercises on a daily basis, and work on incorporating the techniques into her daily activities. Speech therapy continues to be medically necessary for Nedra to participate fully and engage in activities of daily living.     Plan:   I will see Nedra in 2 weeks and will plan to instruct respiratory mechanics briefly then progress cup bubbles to hums and speech tasks.   For practice goals, see AVS.     TOTAL SERVICE TIME: 50 minutes  TREATMENT (11472)  NO CHARGE FACILITY FEE    Susie Henderson (voice), M.S., CCC-SLP  Speech-Language Pathologist  [facility]  [phone]  [email or web address]  Pronouns: she/her/hers      *this report was created in part through the use of computerized dictation software, and though reviewed following completion, some typographic errors may persist.  If there is confusion regarding any of this notes contents, please contact me for clarification

## 2021-10-09 ENCOUNTER — HEALTH MAINTENANCE LETTER (OUTPATIENT)
Age: 21
End: 2021-10-09

## 2021-10-20 ENCOUNTER — VIRTUAL VISIT (OUTPATIENT)
Dept: OTOLARYNGOLOGY | Facility: CLINIC | Age: 21
End: 2021-10-20
Payer: COMMERCIAL

## 2021-10-20 DIAGNOSIS — J38.3 VOCAL CORD DYSFUNCTION: Primary | ICD-10-CM

## 2021-10-20 DIAGNOSIS — R49.0 DYSPHONIA: ICD-10-CM

## 2021-10-20 DIAGNOSIS — R06.09 DYSPNEA ON EXERTION: ICD-10-CM

## 2021-10-20 PROCEDURE — 92507 TX SP LANG VOICE COMM INDIV: CPT | Mod: GN | Performed by: SPEECH-LANGUAGE PATHOLOGIST

## 2021-10-20 NOTE — PATIENT INSTRUCTIONS
"Hello!     It was a pleasure to see you today. Please complete the exercises below and remember, a few minutes of practice many times throughout the day is more important than one large practice session. If you have any questions about your strategies, feel free to contact me at [email or web address] or via [employer]. Please call [phone] for scheduling alterations.      Please know that this email will be automatically deleted from the  after 30 days, so please COPY AND SAVE your exercises and handouts to your own computer.      Home Exercise Program:  - Look into a humidifier for your room, particularly now that the temperatures outside are dropping. If nothing else, put a bowl of hot water on your nightstand or desk on a towel.      VOICE EXERCISES (AM & PM, more if needed)  -- Inhale SILENTLY and feel your belly fill with air  -- Slowly exhale as your belly contracts, ROUND \"OO\" LIPS  -- Straw and 1 inch of water in a cup (or streamer)  -- \"Whoooooo\" (smooth bubbles like a motor boat or a simmering pot of water)    3x silently with just air for 5-7 seconds    3x short and easy sighs     3x foghorn on single pitch for 5-7 seconds    3x sliding lower for 5-7 seconds (\"ding dong\")    3x sliding higher for 5-7 seconds (\"here comes the bride\")    3x sliding up and down like sirens    Bonus: Easy songs/melodies with slurred phrases and no stopping bubbles  -- Double-check that your LIPS are ROUNDED  -- Gravel: Think more breathy and faster blowing.      YAWN SIGHS (2-3x/day) (Tongue Forward)  1. 3 real yawns: feel your soft palate stretch with your tongue forward  2. Yawn and Sigh, keeping the space open    Hah, nate, ho, hi, how, he, hey (2-3x each)    Home, ham, hum, him, hang, hung, hill, hole, hail, heal, solis, honey, hello, hammer, human, hollow, holiday, hamburger, humidity, humorous  Phrases    How are you    Hamburger and french fries    Hello and goodbye    Honey and cinnamon    Hide and go " "seek    Heart of gold    Hundreds of apples    Hurrying along    Hammer and nail    Humorous   Sentences    \"Hhhhi like...\"     Road signs, license plates, counting, ABCs, months, days, etc.     If your roommate is willing, practice some actual conversation.   If you hear \"morning voice\", pause and correct it with a yawn-sigh       REFLUX MANAGEMENT (ongoing as needed)  -- When reflux comes up the wrong way from your stomach and spills into your throat, you may not feel it come up if the acidity levels are low. However, liquid spilling onto the vocal cords can cause lots of throat irritation, cough and throat clearing, hoarseness, and even difficulty breathing.   1. Elevate the head of your bed about 4 inches, trying to keep the mattress straight to avoid any back or neck aches. Try layering blankets and quilts back and forth to form a smooth wedge- about 6\" at the top OR check out the LUCID mattress wedge elevator on [employer].   2. Avoid eating/drinking close to when you lay down. The less there is in your stomach, the less can spill back up during the night.   3. Try to eat foods with a high PH level (less acidic). Possible reflux triggers include spicy foods, acidic foods, and foods that may relax the esophageal sphincters and allow back-splash, such as raw onions, garlic, or peppers, store-bought tomato sauces/salsas, pizza, greasy foods, orange or lemon juice, chocolate, caffeine, carbonation, alcohol, mints and menthol. Try to avoid these foods for about a month so your throat has time to heal and calm down, then reintroduce 1 food every 3-4 days, using a notebook to track any increase in symptoms to figure out which foods are your trigger foods.   4. Avoid putting pressure on your stomach by slouching, wearing tight clothes.   5. Actively reduce stress. Stress can cause a fight or flight response in your body, even if you feel like you're able to handle stress. Try deep breathing, yoga, or mindfulness " meditation to help calm your nervous system (Calm, Headspace, Yoga with Halina on youtube, etc). Square Breathing is an easy to use stress management tool: Inhale for 4 seconds, hold your breath for 4 seconds, exhale for 4 seconds, and hold your breath for 4 seconds. Repeat at least 4 times.   6. You could consider sipping, gargling, and swallowing about 6 oz of alkaline water in the evening, which helps neutralize any acidity coming up to your throat. Keep in mind that alkaline water looses it's alkaline properties the longer it has been bottled, so check the bottling dates or find a store like [employer] where you can fill your own container of freshly alkalized water.      MUCUS MANAGEMENT (ongoing as needed)  -- Mucus is your body's way of cleaning out particles of dust, dirt, and allergens that you've breathed in. These particles get stuck in the mucus and it slowly drips out of your sinuses and down the back of your throat, where it is typically swallowed and removed from your body. Mucus is also produced to help protect your sinuses and nasal passages from excessive dryness in the winter, preventing those tissues from drying and cracking (nosebleeds). If your body is dehydrated, even normal mucus amounts can thicken and get stickier, causing more coughing and throat clearing.  1. Sinus rinse 2-3x/week (NeilMed Sinus Rinse bottle with warmed distilled water and saline mix; breathe out through your mouth on a [facility] as you squeeze, blow your nose after, blow your nose 20-30 minutes later to get the last bit of water out.)  2. Gargle ~2 oz warm water with 1/4 tsp mixture, each AM/PM (Mix a container of equal parts salt/baking soda and keep next to your toothbrush)  3. Drink about 60 ounces of water each day  4. Gel saline spray to be used AM and/or PM ([place], Neilmed)  5. Humidifier or bowl of warm water near your bed or in your office in the winter. Aim for about 45-55% humidity to prevent your mucosal tissues  "from over-drying during the winter. If you aren't sure what the humidity level is, you can purchase a hygrometer for about $10.  6. Steaming: hot showers, boil a pot of water and hold a towel over your head to make a steam tent, facial steamers, wring out a hot, damp washcloth and then hold it over your nose and mouth while breathing.      BELLY BREATHING (3 breaths per hour, until habit)  If you need to initially, stretch your arms up and lean toward each side, feeling your ribcage lift and expand. Then lean forward and allow your back and neck to stretch and relax for 30 seconds.   1. Sit hinged forward with your elbows on your knees. OR Sit normally and place your hands on either side of your belly or low ribs  2. Slowly breathe in and feel your belly and back expand, like filling a balloon, pushing out against your waistband  3. Slowly breathe out and feel your belly and back crunch inward, like zipping tight pants  4. Repeat slowly and take breaks if you get dizzy  HELPFUL HINTS:  -- Tie a string or piece of elastic around your low ribs when you get dressed. You'll be able to feel yourself expand against this as you breathe all day.   -- Practice wall sitting and glue your shoulders to the wall. Focus on feeling your belly expand and contract. Practice laying on your back and feeling your belly then chest expand and then belly then chest deflate.      LARYNGEAL RELEASE  Learning Tasks:  1. Place your finger tips along the center of your throat/Santosh's apple  2. Swallow and notice your voice box move up and back down  3. Compare a high-pitch EE (voice box goes up slightly) with a silent, yawny sigh on \"Huh\" like the word \"hug\" (voice box goes down slightly)  Homework:  1. Attempt to inhale SILENTLY with a deep, yawny \"uh\" shape in your throat and see if your voice box releases slightly lower. LOW, YAWNY \"UH\"  2. Repeat 3 breaths each hour until this release becomes easy and automatic.     RESCUE BREATHS (4-5 " "breaths, 2-3 sets/day and IF SHORT OF BREATH)  -- Use a mirror if it helps to see your mouth and lips  -- Remember to focus on SILENT inhalation every time  -- Practice these daily when you're relaxed and focus on correct technique. The more you practice, the easier these become when you are not relaxed.    TONGUE TUCK: Place the tip of your tongue against the roof of your mouth, creating a column in the center. Breathe in and feel cool & silent air sweep in along the sides of your tongue and down the back of your throat. Now keep your tongue tip in place and simply exhale.     This strategy will make your mouth dry, so stop and move some saliva around with your tongue whenever needed, then return to your strategy.     The tongue tuck is easiest to learn and start applying to movement, but doesn't open the vocal folds as forcefully as some other strategies.     STRAW LIPS & Shhh: Round your lips like a milkshake straw and inhale, feeling cool & silent air sweep through the center of your mouth. Exhale on a \"shhhh\" or \"sh sh sh sh sh\", whichever feels better.     SNIFF & Shhh: Decide whether you prefer one long, gentle sniff in through your nose or 2-3 short, quick sniffs. Next, gently blow out through tight whistle lips with your cheeks ballooned. Blow more gently and slower if you feel dizzy. Exhale on a \"shhhh\" or \"sh sh sh sh sh\", whichever feels better.     PUPPY YAWN & BLOW: Let your tongue relax flat against your bottom lip like a puppy, keeping your lips and jaw open. Silently inhale with a cool, yawny sensation. Next, gently blow out through tight whistle lip with your cheeks ballooned. Blow more gently and slower if you feel dizzy.    TACO TONGUE & BLOW: If you can, curl your tongue slightly like a hard taco shell, keeping your lips and mouth open. Silently inhale with a cool, yawny sensation. Next, gently blow out through tight whistle lip with your cheeks ballooned. Blow more gently and slower if you feel " "dizzy.    JUDI or \"Hand Breath\": Place an ice-cream cone shaped hand against open, rounded lips with your tongue flat in your mouth. Slowly inhale and feel a silent, low breath sweep in and down your throat. Now tighten your hand to a fist and gently blow out, feeling a ballooning stretch through your cheeks and throat. Avoid blowing so hard that you feel strained. This is the failsafe or recovery breath.   Once you feel comfortable practicing your rescue breathing when relaxed, slowly start adding movement.   1. Start by just moving your feet.   2. Once your feet are on auto-, count out loud only on your RIGHT foot (\"In, Out, 2, 3, 4, 5, In, Out, 2, 3, 4, 5\"). Running: In, Out, 2, 3.    3. Gradually add the actual breathing with whichever strategy you prefer. If you get off with the breaths, keep walking and give yourself a few steps to come back in with the breathing.   4. Start very slowly and gradually increase your speed. If you feel like the strategy isn't working, double-check that your technique is correct. Small details like a silent inhalation can make all the difference.(1. SILENT inhale, 2. Low larynx UH, 3. Belly & Ribs)      COUGH & THROAT CLEAR AVOIDANCE (ongoing and as needed)  -- Use these strategies whenever you feel a tickle or itch like you're about to cough or clear your throat. Most people pick 2-3 favorites.    1. Swallow really hard, like getting a golf ball down. Feel your throat squeeze really tight.   2. Keep water with you to sip.  3. See if icy water or ice chips soothe your throat, or if warm/hot beverages soothe your throat. If so, keep some handy in an insulated cup.   4. Hum or sigh on a \"Hmmm\" and feel a gentle vibration in your throat. Get creative at work or in conversations: \"hmm...\" \"mmhmm...\" \"ummm\"  5. Imagine you're blowing up a balloon, but keep your lips closed so your throat and cheeks balloon instead. Hold your breath and count to 5.   6. Silently yawn, letting " "your tongue relax flat like a puppy dog.   7. Gargle a small sip of water.   8. Rescue Breaths   9. Keep using your strategies until at least 40 seconds have gone by.  10. Gently clear your throat with a throat \"achhh\" or a silent \"ah ah ah\" like the Count from [employer]  11. TO STOP A COUGHING FIT: Seal a tight fist around your mouth and block the air from getting out, making your throat/cheeks balloon. Some air will still sneak through and you don't want to block the air completely, but the goal is to create a ballooning back pressure that helps open and calm the vocal folds. Sniff in through your nose to refill, and repeat until the cough stops.  HELPFUL HINTS:  -- Place reminder notes with 2-3 strategies each around your home & work place  -- Recruit friends and family to help remind you if they hear you cough/throat clear      Thank you and have a great day!  Lorenza"

## 2021-10-20 NOTE — PROGRESS NOTES
Nedra Colón is a 21 year old female who is being evaluated via a billable video visit.      Nedra has been notified and verbally consented to the following:     This video visit will be conducted between you and your provider.    Patient has opted to conduct today's video visit vs an in-person appointment.     Video visits are billed at different rates depending on your insurance coverage. Please reach out to your insurance provider with any questions.     If during the course of the call the provider feels the appointment is not appropriate, you will not be charged for this service.  Provider has received verbal consent for billable virtual visit from the patient? Yes  Will anyone else be joining your video visit? No    Call initiated at: 0800   Type of Visit Platform Used: [employer] Video  Location of provider: Home  Location of patient: [facility]  PROGRESS REPORT (CPT 66082)    Patient: Nedra Colón  Date of Service: 10/20/2021  Date of Last Service: 10/8/2021  Referring Physician: Dr. Nye  Impressions from evaluation on 7/5/2021 by Susie Henderson (voice), M.S., CCC-SLP:  Based on today's evaluation and treatment, it would seem likely that Evelyns dyspnea on exertion has been due to both poor laryngeal mechanics (Vocal Cord Dysfunction, J38.3) and non-optimal respiratory mechanics (Dyspnea on Exertion, R06.02).  With training of techniques for laryngeal and respiratory mechanics, she was able to exert herself with greatly reduced symptoms.  She will continue practicing these techniques at home, with reinforcement and instruction of additional strategies to be provided at future sessions. Nedra is in agreement with this plan.    SUBJECTIVE:  Since Nedra's last session, she reports the following:   o Overall symptoms are the same as last time. Her voice is still raspy in the morning, but she hasn't had a chance to go swimming.   o She wasn't able to practice implementing her rescue  breathing strategies with activity as much as she would have liked.     OBJECTIVE:  Patient Specific Goal Metrics:  Dysponia SLP Goals 8/31/2021 10/8/2021 10/20/2021   How severe is your cough /throat clearing, if 0 is no cough at all and 10 is the worst cough? 4 3 3   How would you rate your breathing, if 0 is the worst and 10 is the best? 7 7 7   How much does your breathing problem bother you?         A little bit A little bit A little bit         Patient Supplied Answers to Dyspnea Index Questionnaire:  Dyspnea Index 7/5/2021   1. I have trouble getting air in. 2   2. I feel tightness in my throat when I am having arturo breathing problem. 2   3. It takes more effort to breathe than it used to. 2   4. Change in weather affect my breathing problem. 0   5. My breathing gets worse with stress. 0   6. I make sound/noise breathing in 1   7. I have to strain to breathe. 1   8. My shortness of breath gets worse with exercise or physical activity 4   9. My breathing problem makes me feel stressed. 0   10. My breathing problem casuses me to restrict my personal and social life. 3   Dyspnea Index Total Score 15       Patient Supplied Answers to Dyspnea Index Questionnaire:  Dyspnea Index 10/20/2021   1. I have trouble getting air in. 1   2. I feel tightness in my throat when I am having arturo breathing problem. 1   3. It takes more effort to breathe than it used to. 1   4. Change in weather affect my breathing problem. 0   5. My breathing gets worse with stress. 0   6. I make sound/noise breathing in 1   7. I have to strain to breathe. 1   8. My shortness of breath gets worse with exercise or physical activity 1   9. My breathing problem makes me feel stressed. 0   10. My breathing problem casuses me to restrict my personal and social life. 0   Dyspnea Index Total Score 6         THERAPEUTIC ACTIVITIES  Today Nedra participated in the following therapeutic activities:  I provided Nedra with explanation and skilled  instruction of:  Exercises to coordinate phonation with optimal, specifically increased, flowing airstream and reduce phonatory impact.     Yawn Sigh tasks targeting increased intra-oral space and increased airflow were most facilitating    She progressed through the sigh, word, phrases level of phonatory complexity    Instructed to use as a voice warm up, cool down, coordination of breath flow with phonation, and for tissue mobilization.    Patient demonstrated good learning, but will need practice and additional reinforcement    Instruction of Home Practice:    A revised regimen for home practice was instructed.    I provided an AVS of important notes of today's therapeutic activities to facilitate practice.    ASSESSMENT/PLAN  Progress toward long-term goals:  Good progress; please see report above for objective measures    Impressions:  IMPRESSIONS: poor laryngeal mechanics (Vocal Cord Dysfunction, J38.3) and non-optimal respiratory mechanics (Dyspnea on Exertion, R06.02)     Nedra had a productive session of therapy today, working on reducing vocal strain with speaking.  She will continue to work on her exercises on a daily basis, and work on incorporating the techniques into her daily activities. Speech therapy is no longer medically necessary following today's session, as her dyspnea symptoms appear well under control at this time (see Dyspnea Index above). However, she will contact our clinic should additional concerns arise.     Plan:   Discharge  For practice goals, see AVS.     TOTAL SERVICE TIME: 40 minutes  TREATMENT (95462)  NO CHARGE FACILITY FEE    Susie Henderson (voice) M.S., CCC-SLP  Speech-Language Pathologist  [facility]  [phone]  [email or web address]  Pronouns: she/her/hers      *this report was created in part through the use of computerized dictation software, and though reviewed following completion, some typographic errors may persist.  If there is confusion  regarding any of this notes contents, please contact me for clarification

## 2021-10-20 NOTE — LETTER
10/20/2021       RE: Nedra Colón  [address] [facility] [place] [address]     Dear Colleague,    Thank you for referring your patient, Nedra Colón, to the [facility]ICE CLINIC [place] at [facility]. Please see a copy of my visit note below.    Nedra Colón is a 21 year old female who is being evaluated via a billable video visit.      Nedra has been notified and verbally consented to the following:     This video visit will be conducted between you and your provider.    Patient has opted to conduct today's video visit vs an in-person appointment.     Video visits are billed at different rates depending on your insurance coverage. Please reach out to your insurance provider with any questions.     If during the course of the call the provider feels the appointment is not appropriate, you will not be charged for this service.  Provider has received verbal consent for billable virtual visit from the patient? Yes  Will anyone else be joining your video visit? No    Call initiated at: 0800   Type of Visit Platform Used: Palkion  Location of provider: Home  Location of patient: [facility] VOICE CLINIC  PROGRESS REPORT (CPT 00961)    Patient: Nedra Colón  Date of Service: 10/20/2021  Date of Last Service: 10/8/2021  Referring Physician: Dr. Nye  Impressions from evaluation on 7/5/2021 by Susie Henderson (voice) M.SPeña, CCC-SLP:  Based on today's evaluation and treatment, it would seem likely that Evelyns dyspnea on exertion has been due to both poor laryngeal mechanics (Vocal Cord Dysfunction, J38.3) and non-optimal respiratory mechanics (Dyspnea on Exertion, R06.02).  With training of techniques for laryngeal and respiratory mechanics, she was able to exert herself with greatly reduced symptoms.  She will continue practicing these techniques at home, with reinforcement and instruction of additional strategies to be provided at future  sessions. Nedra is in agreement with this plan.    SUBJECTIVE:  Since Nedra's last session, she reports the following:   o Overall symptoms are the same as last time. Her voice is still raspy in the morning, but she hasn't had a chance to go swimming.   o She wasn't able to practice implementing her rescue breathing strategies with activity as much as she would have liked.     OBJECTIVE:  Patient Specific Goal Metrics:  Dysponia SLP Goals 8/31/2021 10/8/2021 10/20/2021   How severe is your cough /throat clearing, if 0 is no cough at all and 10 is the worst cough? 4 3 3   How would you rate your breathing, if 0 is the worst and 10 is the best? 7 7 7   How much does your breathing problem bother you?         A little bit A little bit A little bit         Patient Supplied Answers to Dyspnea Index Questionnaire:  Dyspnea Index 7/5/2021   1. I have trouble getting air in. 2   2. I feel tightness in my throat when I am having arturo breathing problem. 2   3. It takes more effort to breathe than it used to. 2   4. Change in weather affect my breathing problem. 0   5. My breathing gets worse with stress. 0   6. I make sound/noise breathing in 1   7. I have to strain to breathe. 1   8. My shortness of breath gets worse with exercise or physical activity 4   9. My breathing problem makes me feel stressed. 0   10. My breathing problem casuses me to restrict my personal and social life. 3   Dyspnea Index Total Score 15       Patient Supplied Answers to Dyspnea Index Questionnaire:  Dyspnea Index 10/20/2021   1. I have trouble getting air in. 1   2. I feel tightness in my throat when I am having arturo breathing problem. 1   3. It takes more effort to breathe than it used to. 1   4. Change in weather affect my breathing problem. 0   5. My breathing gets worse with stress. 0   6. I make sound/noise breathing in 1   7. I have to strain to breathe. 1   8. My shortness of breath gets worse with exercise or physical activity 1   9. My  breathing problem makes me feel stressed. 0   10. My breathing problem casuses me to restrict my personal and social life. 0   Dyspnea Index Total Score 6         THERAPEUTIC ACTIVITIES  Today Nedra participated in the following therapeutic activities:  I provided Nedra with explanation and skilled instruction of:  Exercises to coordinate phonation with optimal, specifically increased, flowing airstream and reduce phonatory impact.     Yawn Sigh tasks targeting increased intra-oral space and increased airflow were most facilitating    She progressed through the sigh, word, phrases level of phonatory complexity    Instructed to use as a voice warm up, cool down, coordination of breath flow with phonation, and for tissue mobilization.    Patient demonstrated good learning, but will need practice and additional reinforcement    Instruction of Home Practice:    A revised regimen for home practice was instructed.    I provided an AVS of important notes of today's therapeutic activities to facilitate practice.    ASSESSMENT/PLAN  Progress toward long-term goals:  Good progress; please see report above for objective measures    Impressions:  IMPRESSIONS: poor laryngeal mechanics (Vocal Cord Dysfunction, J38.3) and non-optimal respiratory mechanics (Dyspnea on Exertion, R06.02)     Nedra had a productive session of therapy today, working on reducing vocal strain with speaking.  She will continue to work on her exercises on a daily basis, and work on incorporating the techniques into her daily activities. Speech therapy is no longer medically necessary following today's session, as her dyspnea symptoms appear well under control at this time (see Dyspnea Index above). However, she will contact our clinic should additional concerns arise.     Plan:   Discharge  For practice goals, see AVS.     TOTAL SERVICE TIME: 40 minutes  TREATMENT (88640)  NO CHARGE FACILITY FEE    Susie Henderson (voice), M.S.,  CCC-SLP  Speech-Language Pathologist  [facility]  [phone]  [email or web address]  Pronouns: she/her/hers      *this report was created in part through the use of computerized dictation software, and though reviewed following completion, some typographic errors may persist.  If there is confusion regarding any of this notes contents, please contact me for clarification      Again, thank you for allowing me to participate in the care of your patient.      Sincerely,    Lorenza Finley, SLP

## 2021-11-24 ENCOUNTER — THERAPY VISIT (OUTPATIENT)
Dept: PHYSICAL THERAPY | Facility: CLINIC | Age: 21
End: 2021-11-24
Payer: COMMERCIAL

## 2021-11-24 DIAGNOSIS — R10.31 RIGHT GROIN PAIN: ICD-10-CM

## 2021-11-24 DIAGNOSIS — M25.551 HIP PAIN, RIGHT: Primary | ICD-10-CM

## 2021-11-24 PROCEDURE — 97140 MANUAL THERAPY 1/> REGIONS: CPT | Mod: GP | Performed by: PHYSICAL THERAPIST

## 2021-11-24 PROCEDURE — 97161 PT EVAL LOW COMPLEX 20 MIN: CPT | Mod: GP | Performed by: PHYSICAL THERAPIST

## 2021-11-24 PROCEDURE — 97112 NEUROMUSCULAR REEDUCATION: CPT | Mod: GP | Performed by: PHYSICAL THERAPIST

## 2021-11-24 ASSESSMENT — ACTIVITIES OF DAILY LIVING (ADL)
HOS_ADL_COUNT: 16
TWISTING/PIVOTING_ON_INVOLVED_LEG: NO DIFFICULTY AT ALL
HOW_WOULD_YOU_RATE_YOUR_CURRENT_LEVEL_OF_FUNCTION_DURING_YOUR_USUAL_ACTIVITIES_OF_DAILY_LIVING_FROM_0_TO_100_WITH_100_BEING_YOUR_LEVEL_OF_FUNCTION_PRIOR_TO_YOUR_HIP_PROBLEM_AND_0_BEING_THE_INABILITY_TO_PERFORM_ANY_OF_YOUR_USUAL_DAILY_ACTIVITIES?: 85
RECREATIONAL_ACTIVITIES: MODERATE DIFFICULTY
GOING_DOWN_1_FLIGHT_OF_STAIRS: NO DIFFICULTY AT ALL
STEPPING_UP_AND_DOWN_CURBS: NO DIFFICULTY AT ALL
PUTTING_ON_SOCKS_AND_SHOES: NO DIFFICULTY AT ALL
GOING_UP_1_FLIGHT_OF_STAIRS: NO DIFFICULTY AT ALL
WALKING_DOWN_STEEP_HILLS: NO DIFFICULTY AT ALL
WALKING_UP_STEEP_HILLS: SLIGHT DIFFICULTY
HOS_ADL_ITEM_SCORE_TOTAL: 56
DEEP_SQUATTING: NO DIFFICULTY AT ALL
HOS_ADL_HIGHEST_POTENTIAL_SCORE: 64
HEAVY_WORK: NO DIFFICULTY AT ALL
WALKING_15_MINUTES_OR_GREATER: MODERATE DIFFICULTY
WALKING_APPROXIMATELY_10_MINUTES: MODERATE DIFFICULTY
LIGHT_TO_MODERATE_WORK: NO DIFFICULTY AT ALL
ROLLING_OVER_IN_BED: NO DIFFICULTY AT ALL
GETTING_INTO_AND_OUT_OF_A_BATHTUB: NO DIFFICULTY AT ALL
GETTING_INTO_AND_OUT_OF_AN_AVERAGE_CAR: NO DIFFICULTY AT ALL
HOS_ADL_SCORE(%): 87.5
SITTING_FOR_15_MINUTES: MODERATE DIFFICULTY
STANDING_FOR_15_MINUTES: SLIGHT DIFFICULTY

## 2021-11-24 NOTE — PROGRESS NOTES
Physical Therapy Initial Evaluation  Subjective:  The history is provided by the patient. No  was used.   Therapist Generated HPI Evaluation  Problem details: December 2017.         Type of problem:  Right hip.      Condition occurred with:  Insidious onset.  Where condition occurred: for unknown reasons.  Patient reports pain:  Anterior (2/10 ).  Pain is described as aching and is intermittent.  Pain radiates to:  No radiation. Pain timing: none.  Since onset symptoms are gradually worsening.  Associated with: stiffness. Symptoms are exacerbated by sitting (swimming- all strokes except butterfly)  and relieved by ice.  Special tests included:  MRI (unremarkable MRI low back, arthrogram right hip: normal ).  Past treatment: none. Improved with treatment: na.  Work activity restrictions: na.  Barriers include:  None as reported by patient.                        Objective:  System                                           Hip Evaluation  HIP AROM:    Flexion: Left: 110    Right:  98 with pain     Extension: Left: 15    Right:  10  Abduction: Left:  Wnl    Right:  Wnl      Internal Rotation: Left: 15    Right: 10  External Rotation: Left: 38    Right: 35  Knee Flexion:  Left: WNL    Right: wnl  Knee Extension: Left: WNL    Right: wnl    Hip Strength:    Flexion:   Left: 5-/5   Pain:  Right: 5/5   Pain:                    Extension:  Left: 5/5  Pain:Right: 4+/5    Pain:    Abduction:  Left: 4/5     Pain:Right: 3+/5    Pain:    Internal Rotation:  Left: 5/5    Pain:Right: 5/5   Pain:  External Rotation:  Left: 5/5   Pain:  Right: 5/5   Pain:  Knee Flexion:  Left: 5/5   Pain:Right: 4+/5   Pain:  Knee Extension:  Left: 5/5   Pain:Right: 5/5    Pain:        Hip Special Testing:   Not Assessed          Functional Testing:  not assessed                     Poor gluteus david contraction (B), normal pubic symphysis mechanics, moderate right posterior PSIS, FRS Right L1-L5, tightness noted with palpation  of tensor fascia lawanda and gluteus medius muscles  (R), (B) lower abdominal tightness with palpation, mmt: gluteus david: 3+/5 (R), 4/5 (L)   General     ROS    Assessment/Plan:    Patient is a 21 year old female with right side hip complaints.    Patient has the following significant findings with corresponding treatment plan.                Diagnosis 1:  Right hip pain   Pain -  hot/cold therapy, manual therapy, self management, education, directional preference exercise and home program  Decreased ROM/flexibility - manual therapy, therapeutic exercise, therapeutic activity and home program  Decreased joint mobility - manual therapy, therapeutic exercise, therapeutic activity and home program  Decreased strength - therapeutic exercise, therapeutic activities and home program  Impaired muscle performance - neuro re-education and home program  Decreased function - therapeutic activities and home program  Instability -  Therapeutic Activity  Therapeutic Exercise  Neuromuscular Re-education  home program  Diagnosis 2:  Right groin pain      Pain -  hot/cold therapy, manual therapy, self management, education, directional preference exercise and home program  Decreased ROM/flexibility - manual therapy, therapeutic exercise, therapeutic activity and home program  Decreased joint mobility - manual therapy, therapeutic exercise, therapeutic activity and home program  Decreased strength - therapeutic exercise, therapeutic activities and home program  Impaired muscle performance - neuro re-education and home program  Decreased function - therapeutic activities and home program  Instability -  Therapeutic Activity  Therapeutic Exercise  Neuromuscular Re-education  home program      Therapy Evaluation Codes:     1) Clinical presentation characteristics are:   Stable/Uncomplicated.  2) Decision-Making    Low complexity using standardized patient assessment instrument and/or measureable assessment of functional  outcome.  Cumulative Therapy Evaluation is: Low complexity.    Previous and current functional limitations:  (See Goal Flow Sheet for this information)    Short term and Long term goals: (See Goal Flow Sheet for this information)     Communication ability:  Patient appears to be able to clearly communicate and understand verbal and written communication and follow directions correctly.  Treatment Explanation - The following has been discussed with the patient:   RX ordered/plan of care  Anticipated outcomes  Possible risks and side effects  This patient would benefit from PT intervention to resume normal activities.   Rehab potential is good.    Frequency:  1 X week, once daily  Duration:  for 6 weeks  Discharge Plan:  Achieve all LTG.  Independent in home treatment program.  Reach maximal therapeutic benefit.    Please refer to the daily flowsheet for treatment today, total treatment time and time spent performing 1:1 timed codes.

## 2021-11-24 NOTE — LETTER
[facility]  [address] [facility] [address] [facility] [address]  [phone]    2021    Re: Nedra Colón   :   2000  MRN:  [id number]   REFERRING PHYSICIAN:   Marybel SNYDER [facility]    Date of Initial Evaluation: 21  Visits:  Rxs Used: 1  Reason for Referral:     Hip pain, right  Right groin pain    EVALUATION SUMMARY    Physical Therapy Initial Evaluation  Subjective:  The history is provided by the patient. No  was used.   Therapist Generated HPI Evaluation  Problem details: 2017.         Type of problem:  Right hip.  Condition occurred with:  Insidious onset.  Where condition occurred: for unknown reasons.  Patient reports pain:  Anterior (2/10 ).  Pain is described as aching and is intermittent.  Pain radiates to:  No radiation. Pain timing: none.  Since onset symptoms are gradually worsening.  Associated with: stiffness. Symptoms are exacerbated by sitting (swimming- all strokes except butterfly)  and relieved by ice.  Special tests included:  MRI (unremarkable MRI low back, arthrogram right hip: normal ).  Past treatment: none. Improved with treatment: na.  Work activity restrictions: na.  Barriers include:  None as reported by patient.    Patient Health History  Pain is reported as 2/10 on pain scale.  General health as reported by patient is excellent.  Pertinent medical history includes: none.   Red flags:  None as reported by patient.  Medical allergies: none.   Surgeries include:  None.    Current medications:  None.    Current occupation is Student.   Primary job tasks include:  Computer work and prolonged sitting.                         Re: Nedra Colón   :   2000    Objective:     Hip Evaluation  HIP AROM:    Flexion: Left: 110    Right:  98 with pain   Extension: Left: 15    Right:  10  Abduction: Left:  Wnl    Right:  Wnl  Internal  Rotation: Left: 15    Right: 10  External Rotation: Left: 38    Right: 35  Knee Flexion:  Left: WNL    Right: wnl  Knee Extension: Left: WNL    Right: wnl    Hip Strength:    Flexion:   Left: 5-/5   Pain:  Right: 5/5   Pain:                Extension:  Left: 5/5  Pain:Right: 4+/5    Pain:    Abduction:  Left: 4/5     Pain:Right: 3+/5    Pain:  Internal Rotation:  Left: 5/5    Pain:Right: 5/5   Pain:  External Rotation:  Left: 5/5   Pain:  Right: 5/5   Pain:  Knee Flexion:  Left: 5/5   Pain:Right: 4+/5   Pain:  Knee Extension:  Left: 5/5   Pain:Right: 5/5    Pain:      Hip Special Testing:   Not Assessed  Functional Testing:  not assessed    Poor gluteus david contraction (B), normal pubic symphysis mechanics, moderate right posterior PSIS, FRS Right L1-L5, tightness noted with palpation of tensor fascia lawanda and gluteus medius muscles  (R), (B) lower abdominal tightness with palpation, mmt: gluteus david: 3+/5 (R), 4/5 (L)   General     Assessment/Plan:    Patient is a 21 year old female with right side hip complaints.    Patient has the following significant findings with corresponding treatment plan.                Diagnosis 1:  Right hip pain   Pain -  hot/cold therapy, manual therapy, self management, education, directional preference exercise and home program  Decreased ROM/flexibility - manual therapy, therapeutic exercise, therapeutic activity and home program  Decreased joint mobility - manual therapy, therapeutic exercise, therapeutic activity and home program  Decreased strength - therapeutic exercise, therapeutic activities and home program  Impaired muscle performance - neuro re-education and home program  Decreased function - therapeutic activities and home program  Instability -  Therapeutic Activity  Therapeutic Exercise  Neuromuscular Re-education  home program  Diagnosis 2:  Right groin pain      Pain -  hot/cold therapy, manual therapy, self management, education, directional preference exercise  and home program  Decreased ROM/flexibility - manual therapy, therapeutic exercise, therapeutic activity and home program  Decreased joint mobility - manual therapy, therapeutic exercise, therapeutic activity and home program  Decreased strength - therapeutic exercise, therapeutic activities and home program  Re: Nedra Colón   :   2000    Impaired muscle performance - neuro re-education and home program  Decreased function - therapeutic activities and home program  Instability -  Therapeutic Activity  Therapeutic Exercise  Neuromuscular Re-education  home program      Therapy Evaluation Codes:     1) Clinical presentation characteristics are:   Stable/Uncomplicated.  2) Decision-Making    Low complexity using standardized patient assessment instrument and/or measureable assessment of functional outcome.  Cumulative Therapy Evaluation is: Low complexity.    Previous and current functional limitations:  (See Goal Flow Sheet for this information)    Short term and Long term goals: (See Goal Flow Sheet for this information)     Communication ability:  Patient appears to be able to clearly communicate and understand verbal and written communication and follow directions correctly.  Treatment Explanation - The following has been discussed with the patient:   RX ordered/plan of care  Anticipated outcomes  Possible risks and side effects  This patient would benefit from PT intervention to resume normal activities.   Rehab potential is good.    Frequency:  1 X week, once daily  Duration:  for 6 weeks  Discharge Plan:  Achieve all LTG.  Independent in home treatment program.  Reach maximal therapeutic benefit.    Thank you for your referral.    INQUIRIES  Therapist: Norma Horan PT  [address] [address]  Phone: [phone]  Fax: [phone]

## 2021-11-26 NOTE — PROGRESS NOTES
Physical Therapy Initial Evaluation  Subjective:    Patient Health History         Pain is reported as 2/10 on pain scale.  General health as reported by patient is excellent.  Pertinent medical history includes: none.   Red flags:  None as reported by patient.  Medical allergies: none.   Surgeries include:  None.    Current medications:  None.    Current occupation is Student.   Primary job tasks include:  Computer work and prolonged sitting.                                    Objective:  System    Physical Exam    General     ROS    Assessment/Plan:

## 2021-12-21 ENCOUNTER — THERAPY VISIT (OUTPATIENT)
Dept: PHYSICAL THERAPY | Facility: CLINIC | Age: 21
End: 2021-12-21
Payer: COMMERCIAL

## 2021-12-21 DIAGNOSIS — R10.31 RIGHT GROIN PAIN: ICD-10-CM

## 2021-12-21 DIAGNOSIS — M25.551 HIP PAIN, RIGHT: Primary | ICD-10-CM

## 2021-12-21 PROCEDURE — 97140 MANUAL THERAPY 1/> REGIONS: CPT | Mod: GP | Performed by: PHYSICAL THERAPIST

## 2021-12-21 PROCEDURE — 97112 NEUROMUSCULAR REEDUCATION: CPT | Mod: GP | Performed by: PHYSICAL THERAPIST

## 2021-12-28 ENCOUNTER — THERAPY VISIT (OUTPATIENT)
Dept: PHYSICAL THERAPY | Facility: CLINIC | Age: 21
End: 2021-12-28
Payer: COMMERCIAL

## 2021-12-28 DIAGNOSIS — M25.551 HIP PAIN, RIGHT: Primary | ICD-10-CM

## 2021-12-28 DIAGNOSIS — R10.31 RIGHT GROIN PAIN: ICD-10-CM

## 2021-12-28 PROCEDURE — 97112 NEUROMUSCULAR REEDUCATION: CPT | Mod: GP | Performed by: PHYSICAL THERAPIST

## 2021-12-28 PROCEDURE — 97140 MANUAL THERAPY 1/> REGIONS: CPT | Mod: GP | Performed by: PHYSICAL THERAPIST

## 2022-01-04 ENCOUNTER — THERAPY VISIT (OUTPATIENT)
Dept: PHYSICAL THERAPY | Facility: CLINIC | Age: 22
End: 2022-01-04
Payer: COMMERCIAL

## 2022-01-04 DIAGNOSIS — M25.551 HIP PAIN, RIGHT: Primary | ICD-10-CM

## 2022-01-04 DIAGNOSIS — R10.31 RIGHT GROIN PAIN: ICD-10-CM

## 2022-01-04 PROCEDURE — 97112 NEUROMUSCULAR REEDUCATION: CPT | Mod: GP | Performed by: PHYSICAL THERAPIST

## 2022-01-04 PROCEDURE — 97140 MANUAL THERAPY 1/> REGIONS: CPT | Mod: GP | Performed by: PHYSICAL THERAPIST

## 2022-01-11 ENCOUNTER — THERAPY VISIT (OUTPATIENT)
Dept: PHYSICAL THERAPY | Facility: CLINIC | Age: 22
End: 2022-01-11
Payer: COMMERCIAL

## 2022-01-11 DIAGNOSIS — M25.551 HIP PAIN, RIGHT: Primary | ICD-10-CM

## 2022-01-11 DIAGNOSIS — R10.31 RIGHT GROIN PAIN: ICD-10-CM

## 2022-01-11 PROCEDURE — 97140 MANUAL THERAPY 1/> REGIONS: CPT | Mod: GP | Performed by: PHYSICAL THERAPIST

## 2022-01-11 PROCEDURE — 97112 NEUROMUSCULAR REEDUCATION: CPT | Mod: GP | Performed by: PHYSICAL THERAPIST

## 2022-01-28 ENCOUNTER — THERAPY VISIT (OUTPATIENT)
Dept: PHYSICAL THERAPY | Facility: CLINIC | Age: 22
End: 2022-01-28
Payer: COMMERCIAL

## 2022-01-28 DIAGNOSIS — M25.551 HIP PAIN, RIGHT: Primary | ICD-10-CM

## 2022-01-28 DIAGNOSIS — R10.31 RIGHT GROIN PAIN: ICD-10-CM

## 2022-01-28 PROCEDURE — 97140 MANUAL THERAPY 1/> REGIONS: CPT | Mod: GP | Performed by: PHYSICAL THERAPIST

## 2022-01-28 PROCEDURE — 97110 THERAPEUTIC EXERCISES: CPT | Mod: GP | Performed by: PHYSICAL THERAPIST

## 2022-02-11 ENCOUNTER — THERAPY VISIT (OUTPATIENT)
Dept: PHYSICAL THERAPY | Facility: CLINIC | Age: 22
End: 2022-02-11
Payer: COMMERCIAL

## 2022-02-11 DIAGNOSIS — R10.31 RIGHT GROIN PAIN: ICD-10-CM

## 2022-02-11 DIAGNOSIS — M25.551 HIP PAIN, RIGHT: Primary | ICD-10-CM

## 2022-02-11 PROCEDURE — 97112 NEUROMUSCULAR REEDUCATION: CPT | Mod: GP | Performed by: PHYSICAL THERAPIST

## 2022-02-11 PROCEDURE — 97140 MANUAL THERAPY 1/> REGIONS: CPT | Mod: GP | Performed by: PHYSICAL THERAPIST

## 2022-02-11 PROCEDURE — 97110 THERAPEUTIC EXERCISES: CPT | Mod: GP | Performed by: PHYSICAL THERAPIST

## 2022-03-02 ENCOUNTER — THERAPY VISIT (OUTPATIENT)
Dept: PHYSICAL THERAPY | Facility: CLINIC | Age: 22
End: 2022-03-02
Payer: COMMERCIAL

## 2022-03-02 DIAGNOSIS — M25.551 HIP PAIN, RIGHT: Primary | ICD-10-CM

## 2022-03-02 DIAGNOSIS — R10.31 RIGHT GROIN PAIN: ICD-10-CM

## 2022-03-02 PROCEDURE — 97112 NEUROMUSCULAR REEDUCATION: CPT | Mod: GP | Performed by: PHYSICAL THERAPIST

## 2022-03-02 PROCEDURE — 97110 THERAPEUTIC EXERCISES: CPT | Mod: GP | Performed by: PHYSICAL THERAPIST

## 2022-09-17 ENCOUNTER — HEALTH MAINTENANCE LETTER (OUTPATIENT)
Age: 22
End: 2022-09-17

## 2023-01-03 NOTE — PROGRESS NOTES
Patient did not return for further treatment and no additional progress was noted.  Please refer to the progress note and goal flowsheet completed on 3/2/22  for discharge information.

## 2023-10-07 ENCOUNTER — HEALTH MAINTENANCE LETTER (OUTPATIENT)
Age: 23
End: 2023-10-07

## 2024-11-30 ENCOUNTER — HEALTH MAINTENANCE LETTER (OUTPATIENT)
Age: 24
End: 2024-11-30